# Patient Record
Sex: MALE | Race: AMERICAN INDIAN OR ALASKA NATIVE | NOT HISPANIC OR LATINO | Employment: FULL TIME | RURAL
[De-identification: names, ages, dates, MRNs, and addresses within clinical notes are randomized per-mention and may not be internally consistent; named-entity substitution may affect disease eponyms.]

---

## 2021-10-22 ENCOUNTER — HOSPITAL ENCOUNTER (INPATIENT)
Facility: HOSPITAL | Age: 51
LOS: 5 days | Discharge: HOME HEALTH | DRG: 256 | End: 2021-10-27
Attending: HOSPITALIST | Admitting: HOSPITALIST
Payer: COMMERCIAL

## 2021-10-22 DIAGNOSIS — E11.8 DIABETIC FOOT: ICD-10-CM

## 2021-10-22 DIAGNOSIS — E11.59 TYPE 2 DIABETES MELLITUS WITH OTHER CIRCULATORY COMPLICATION, WITHOUT LONG-TERM CURRENT USE OF INSULIN: ICD-10-CM

## 2021-10-22 DIAGNOSIS — E11.65 TYPE 2 DIABETES MELLITUS WITH HYPERGLYCEMIA, WITHOUT LONG-TERM CURRENT USE OF INSULIN: Primary | ICD-10-CM

## 2021-10-22 DIAGNOSIS — E11.9 TYPE 2 DIABETES MELLITUS WITHOUT COMPLICATION, UNSPECIFIED WHETHER LONG TERM INSULIN USE: ICD-10-CM

## 2021-10-22 DIAGNOSIS — Z01.818 PREOPERATIVE CLEARANCE: ICD-10-CM

## 2021-10-22 LAB
ALBUMIN SERPL BCP-MCNC: 2.5 G/DL (ref 3.5–5)
ALBUMIN/GLOB SERPL: 0.4 {RATIO}
ALP SERPL-CCNC: 427 U/L (ref 45–115)
ALT SERPL W P-5'-P-CCNC: 35 U/L (ref 16–61)
ANION GAP SERPL CALCULATED.3IONS-SCNC: 9 MMOL/L (ref 7–16)
APTT PPP: 35.1 SECONDS (ref 25.2–37.3)
AST SERPL W P-5'-P-CCNC: 23 U/L (ref 15–37)
BASOPHILS # BLD AUTO: 0.04 K/UL (ref 0–0.2)
BASOPHILS NFR BLD AUTO: 0.4 % (ref 0–1)
BILIRUB SERPL-MCNC: 0.6 MG/DL (ref 0–1.2)
BUN SERPL-MCNC: 5 MG/DL (ref 7–18)
BUN/CREAT SERPL: 5 (ref 6–20)
CALCIUM SERPL-MCNC: 8.8 MG/DL (ref 8.5–10.1)
CHLORIDE SERPL-SCNC: 100 MMOL/L (ref 98–107)
CO2 SERPL-SCNC: 31 MMOL/L (ref 21–32)
CREAT SERPL-MCNC: 0.91 MG/DL (ref 0.7–1.3)
CRP SERPL-MCNC: 8.5 MG/DL (ref 0–0.8)
DIFFERENTIAL METHOD BLD: ABNORMAL
EOSINOPHIL # BLD AUTO: 0.1 K/UL (ref 0–0.5)
EOSINOPHIL NFR BLD AUTO: 1 % (ref 1–4)
ERYTHROCYTE [DISTWIDTH] IN BLOOD BY AUTOMATED COUNT: 12.9 % (ref 11.5–14.5)
ERYTHROCYTE [SEDIMENTATION RATE] IN BLOOD BY WESTERGREN METHOD: 106 MM/HR (ref 0–20)
EST. AVERAGE GLUCOSE BLD GHB EST-MCNC: 240 MG/DL
GLOBULIN SER-MCNC: 6.3 G/DL (ref 2–4)
GLUCOSE SERPL-MCNC: 188 MG/DL (ref 74–106)
GLUCOSE SERPL-MCNC: 192 MG/DL (ref 70–105)
HBA1C MFR BLD HPLC: 9.8 % (ref 4.5–6.6)
HCT VFR BLD AUTO: 38.6 % (ref 40–54)
HGB BLD-MCNC: 13.3 G/DL (ref 13.5–18)
IMM GRANULOCYTES # BLD AUTO: 0.05 K/UL (ref 0–0.04)
IMM GRANULOCYTES NFR BLD: 0.5 % (ref 0–0.4)
INR BLD: 0.89 (ref 0.9–1.1)
LYMPHOCYTES # BLD AUTO: 1.59 K/UL (ref 1–4.8)
LYMPHOCYTES NFR BLD AUTO: 16.2 % (ref 27–41)
MAGNESIUM SERPL-MCNC: 2.1 MG/DL (ref 1.7–2.3)
MCH RBC QN AUTO: 30.9 PG (ref 27–31)
MCHC RBC AUTO-ENTMCNC: 34.5 G/DL (ref 32–36)
MCV RBC AUTO: 89.6 FL (ref 80–96)
MONOCYTES # BLD AUTO: 0.77 K/UL (ref 0–0.8)
MONOCYTES NFR BLD AUTO: 7.8 % (ref 2–6)
MPC BLD CALC-MCNC: 10.1 FL (ref 9.4–12.4)
NEUTROPHILS # BLD AUTO: 7.27 K/UL (ref 1.8–7.7)
NEUTROPHILS NFR BLD AUTO: 74.1 % (ref 53–65)
NRBC # BLD AUTO: 0 X10E3/UL
NRBC, AUTO (.00): 0 %
PLATELET # BLD AUTO: 568 K/UL (ref 150–400)
POTASSIUM SERPL-SCNC: 4.1 MMOL/L (ref 3.5–5.1)
PROT SERPL-MCNC: 8.8 G/DL (ref 6.4–8.2)
PROTHROMBIN TIME: 12.1 SECONDS (ref 11.7–14.7)
RBC # BLD AUTO: 4.31 M/UL (ref 4.6–6.2)
SODIUM SERPL-SCNC: 136 MMOL/L (ref 136–145)
WBC # BLD AUTO: 9.82 K/UL (ref 4.5–11)

## 2021-10-22 PROCEDURE — 63600175 PHARM REV CODE 636 W HCPCS: Performed by: HOSPITALIST

## 2021-10-22 PROCEDURE — 85025 COMPLETE CBC W/AUTO DIFF WBC: CPT | Performed by: HOSPITALIST

## 2021-10-22 PROCEDURE — 25000003 PHARM REV CODE 250: Performed by: HOSPITALIST

## 2021-10-22 PROCEDURE — 36415 COLL VENOUS BLD VENIPUNCTURE: CPT | Performed by: HOSPITALIST

## 2021-10-22 PROCEDURE — 85730 THROMBOPLASTIN TIME PARTIAL: CPT | Performed by: HOSPITALIST

## 2021-10-22 PROCEDURE — 86140 C-REACTIVE PROTEIN: CPT | Performed by: HOSPITALIST

## 2021-10-22 PROCEDURE — 83735 ASSAY OF MAGNESIUM: CPT | Performed by: HOSPITALIST

## 2021-10-22 PROCEDURE — 83036 HEMOGLOBIN GLYCOSYLATED A1C: CPT | Performed by: HOSPITALIST

## 2021-10-22 PROCEDURE — 85610 PROTHROMBIN TIME: CPT | Performed by: HOSPITALIST

## 2021-10-22 PROCEDURE — C9399 UNCLASSIFIED DRUGS OR BIOLOG: HCPCS | Performed by: HOSPITALIST

## 2021-10-22 PROCEDURE — 11000001 HC ACUTE MED/SURG PRIVATE ROOM

## 2021-10-22 PROCEDURE — 99222 PR INITIAL HOSPITAL CARE,LEVL II: ICD-10-PCS | Mod: ,,, | Performed by: HOSPITALIST

## 2021-10-22 PROCEDURE — 93010 ELECTROCARDIOGRAM REPORT: CPT | Mod: ,,, | Performed by: INTERNAL MEDICINE

## 2021-10-22 PROCEDURE — 80053 COMPREHEN METABOLIC PANEL: CPT | Performed by: HOSPITALIST

## 2021-10-22 PROCEDURE — 99222 1ST HOSP IP/OBS MODERATE 55: CPT | Mod: ,,, | Performed by: HOSPITALIST

## 2021-10-22 PROCEDURE — 93010 EKG 12-LEAD: ICD-10-PCS | Mod: ,,, | Performed by: INTERNAL MEDICINE

## 2021-10-22 PROCEDURE — 82962 GLUCOSE BLOOD TEST: CPT

## 2021-10-22 PROCEDURE — 85651 RBC SED RATE NONAUTOMATED: CPT | Performed by: HOSPITALIST

## 2021-10-22 PROCEDURE — 93005 ELECTROCARDIOGRAM TRACING: CPT

## 2021-10-22 RX ORDER — ONDANSETRON 2 MG/ML
8 INJECTION INTRAMUSCULAR; INTRAVENOUS EVERY 6 HOURS PRN
Status: DISCONTINUED | OUTPATIENT
Start: 2021-10-22 | End: 2021-10-27 | Stop reason: HOSPADM

## 2021-10-22 RX ORDER — INSULIN ASPART 100 [IU]/ML
1-10 INJECTION, SOLUTION INTRAVENOUS; SUBCUTANEOUS
Status: DISCONTINUED | OUTPATIENT
Start: 2021-10-22 | End: 2021-10-23

## 2021-10-22 RX ORDER — BISACODYL 5 MG
10 TABLET, DELAYED RELEASE (ENTERIC COATED) ORAL DAILY PRN
Status: DISCONTINUED | OUTPATIENT
Start: 2021-10-22 | End: 2021-10-27 | Stop reason: HOSPADM

## 2021-10-22 RX ORDER — GLUCAGON 1 MG
1 KIT INJECTION
Status: DISCONTINUED | OUTPATIENT
Start: 2021-10-22 | End: 2021-10-23

## 2021-10-22 RX ORDER — TRAZODONE HYDROCHLORIDE 50 MG/1
50 TABLET ORAL NIGHTLY PRN
Status: DISCONTINUED | OUTPATIENT
Start: 2021-10-22 | End: 2021-10-27 | Stop reason: HOSPADM

## 2021-10-22 RX ORDER — IBUPROFEN 200 MG
24 TABLET ORAL
Status: DISCONTINUED | OUTPATIENT
Start: 2021-10-22 | End: 2021-10-23

## 2021-10-22 RX ORDER — ACETAMINOPHEN 500 MG
1000 TABLET ORAL EVERY 6 HOURS PRN
Status: DISCONTINUED | OUTPATIENT
Start: 2021-10-22 | End: 2021-10-27 | Stop reason: HOSPADM

## 2021-10-22 RX ORDER — GUAIFENESIN/DEXTROMETHORPHAN 100-10MG/5
10 SYRUP ORAL EVERY 6 HOURS PRN
Status: DISCONTINUED | OUTPATIENT
Start: 2021-10-22 | End: 2021-10-27 | Stop reason: HOSPADM

## 2021-10-22 RX ORDER — SIMETHICONE 80 MG
1 TABLET,CHEWABLE ORAL 3 TIMES DAILY PRN
Status: DISCONTINUED | OUTPATIENT
Start: 2021-10-22 | End: 2021-10-27 | Stop reason: HOSPADM

## 2021-10-22 RX ORDER — MORPHINE SULFATE 4 MG/ML
4 INJECTION, SOLUTION INTRAMUSCULAR; INTRAVENOUS ONCE
Status: COMPLETED | OUTPATIENT
Start: 2021-10-22 | End: 2021-10-22

## 2021-10-22 RX ORDER — TRAMADOL HYDROCHLORIDE 50 MG/1
100 TABLET ORAL EVERY 6 HOURS PRN
Status: DISCONTINUED | OUTPATIENT
Start: 2021-10-22 | End: 2021-10-27 | Stop reason: HOSPADM

## 2021-10-22 RX ORDER — DIPHENHYDRAMINE HYDROCHLORIDE 50 MG/ML
50 INJECTION INTRAMUSCULAR; INTRAVENOUS ONCE
Status: COMPLETED | OUTPATIENT
Start: 2021-10-22 | End: 2021-10-22

## 2021-10-22 RX ORDER — IBUPROFEN 200 MG
16 TABLET ORAL
Status: DISCONTINUED | OUTPATIENT
Start: 2021-10-22 | End: 2021-10-23

## 2021-10-22 RX ADMIN — INSULIN ASPART 1 UNITS: 100 INJECTION, SOLUTION INTRAVENOUS; SUBCUTANEOUS at 08:10

## 2021-10-22 RX ADMIN — INSULIN DETEMIR 10 UNITS: 100 INJECTION, SOLUTION SUBCUTANEOUS at 08:10

## 2021-10-22 RX ADMIN — MORPHINE SULFATE 4 MG: 4 INJECTION INTRAVENOUS at 09:10

## 2021-10-22 RX ADMIN — PIPERACILLIN AND TAZOBACTAM 4.5 G: 4; .5 INJECTION, POWDER, LYOPHILIZED, FOR SOLUTION INTRAVENOUS; PARENTERAL at 08:10

## 2021-10-22 RX ADMIN — DIPHENHYDRAMINE HYDROCHLORIDE 50 MG: 50 INJECTION INTRAMUSCULAR; INTRAVENOUS at 09:10

## 2021-10-23 ENCOUNTER — ANESTHESIA (OUTPATIENT)
Dept: SURGERY | Facility: HOSPITAL | Age: 51
DRG: 256 | End: 2021-10-23
Payer: COMMERCIAL

## 2021-10-23 ENCOUNTER — ANESTHESIA EVENT (OUTPATIENT)
Dept: SURGERY | Facility: HOSPITAL | Age: 51
DRG: 256 | End: 2021-10-23
Payer: COMMERCIAL

## 2021-10-23 LAB
GLUCOSE SERPL-MCNC: 78 MG/DL (ref 70–105)
GLUCOSE SERPL-MCNC: 78 MG/DL (ref 70–105)
GLUCOSE SERPL-MCNC: 82 MG/DL (ref 70–105)
VANCOMYCIN SERPL-MCNC: 11.8 ΜG/ML (ref 0–20)

## 2021-10-23 PROCEDURE — 87070 CULTURE OTHR SPECIMN AEROBIC: CPT | Performed by: SURGERY

## 2021-10-23 PROCEDURE — 28820 AMPUTATION OF TOE: CPT | Mod: T6,,, | Performed by: SURGERY

## 2021-10-23 PROCEDURE — 36000706: Performed by: SURGERY

## 2021-10-23 PROCEDURE — 88305 TISSUE EXAM BY PATHOLOGIST: CPT | Mod: SUR | Performed by: SURGERY

## 2021-10-23 PROCEDURE — 88311 DECALCIFY TISSUE: CPT | Mod: 26,,, | Performed by: PATHOLOGY

## 2021-10-23 PROCEDURE — 71000033 HC RECOVERY, INTIAL HOUR: Performed by: SURGERY

## 2021-10-23 PROCEDURE — 88311 SURGICAL PATHOLOGY: ICD-10-PCS | Mod: 26,,, | Performed by: PATHOLOGY

## 2021-10-23 PROCEDURE — 11000001 HC ACUTE MED/SURG PRIVATE ROOM

## 2021-10-23 PROCEDURE — 87075 CULTR BACTERIA EXCEPT BLOOD: CPT | Performed by: SURGERY

## 2021-10-23 PROCEDURE — 36415 COLL VENOUS BLD VENIPUNCTURE: CPT | Performed by: HOSPITALIST

## 2021-10-23 PROCEDURE — 80202 ASSAY OF VANCOMYCIN: CPT | Performed by: HOSPITALIST

## 2021-10-23 PROCEDURE — 99233 PR SUBSEQUENT HOSPITAL CARE,LEVL III: ICD-10-PCS | Mod: ,,, | Performed by: HOSPITALIST

## 2021-10-23 PROCEDURE — 10061 I&D ABSCESS COMP/MULTIPLE: CPT | Mod: 51,,, | Performed by: SURGERY

## 2021-10-23 PROCEDURE — 63600175 PHARM REV CODE 636 W HCPCS: Performed by: HOSPITALIST

## 2021-10-23 PROCEDURE — 88305 TISSUE EXAM BY PATHOLOGIST: CPT | Mod: 26,,, | Performed by: PATHOLOGY

## 2021-10-23 PROCEDURE — 25000003 PHARM REV CODE 250: Performed by: HOSPITALIST

## 2021-10-23 PROCEDURE — 88305 TISSUE EXAM BY PATHOLOGIST: ICD-10-PCS | Mod: 26,XU,, | Performed by: PATHOLOGY

## 2021-10-23 PROCEDURE — D9220A PRA ANESTHESIA: ICD-10-PCS | Mod: ,,, | Performed by: ANESTHESIOLOGY

## 2021-10-23 PROCEDURE — 28820 PR AMPUTATION TOE,MT-P JT: ICD-10-PCS | Mod: T6,,, | Performed by: SURGERY

## 2021-10-23 PROCEDURE — 36000707: Performed by: SURGERY

## 2021-10-23 PROCEDURE — 10061 PR DRAIN SKIN ABSCESS COMPLIC: ICD-10-PCS | Mod: 51,,, | Performed by: SURGERY

## 2021-10-23 PROCEDURE — 99233 SBSQ HOSP IP/OBS HIGH 50: CPT | Mod: ,,, | Performed by: HOSPITALIST

## 2021-10-23 PROCEDURE — 37000009 HC ANESTHESIA EA ADD 15 MINS: Performed by: SURGERY

## 2021-10-23 PROCEDURE — D9220A PRA ANESTHESIA: Mod: ,,, | Performed by: ANESTHESIOLOGY

## 2021-10-23 PROCEDURE — 27201423 OPTIME MED/SURG SUP & DEVICES STERILE SUPPLY: Performed by: SURGERY

## 2021-10-23 PROCEDURE — 37000008 HC ANESTHESIA 1ST 15 MINUTES: Performed by: SURGERY

## 2021-10-23 PROCEDURE — 25000003 PHARM REV CODE 250: Performed by: SURGERY

## 2021-10-23 PROCEDURE — 63600175 PHARM REV CODE 636 W HCPCS: Performed by: ANESTHESIOLOGY

## 2021-10-23 PROCEDURE — 82962 GLUCOSE BLOOD TEST: CPT

## 2021-10-23 PROCEDURE — 88305 TISSUE EXAM BY PATHOLOGIST: CPT | Mod: 26,XU,, | Performed by: PATHOLOGY

## 2021-10-23 RX ORDER — ASPIRIN 81 MG/1
81 TABLET ORAL DAILY
Status: DISCONTINUED | OUTPATIENT
Start: 2021-10-23 | End: 2021-10-27 | Stop reason: HOSPADM

## 2021-10-23 RX ORDER — PRAVASTATIN SODIUM 20 MG/1
20 TABLET ORAL NIGHTLY
Status: ON HOLD | COMMUNITY
End: 2023-05-02 | Stop reason: HOSPADM

## 2021-10-23 RX ORDER — MORPHINE SULFATE 10 MG/ML
4 INJECTION INTRAMUSCULAR; INTRAVENOUS; SUBCUTANEOUS EVERY 5 MIN PRN
Status: DISCONTINUED | OUTPATIENT
Start: 2021-10-23 | End: 2021-10-23 | Stop reason: HOSPADM

## 2021-10-23 RX ORDER — OXYCODONE HYDROCHLORIDE 5 MG/1
5 TABLET ORAL
Status: DISCONTINUED | OUTPATIENT
Start: 2021-10-23 | End: 2021-10-23 | Stop reason: HOSPADM

## 2021-10-23 RX ORDER — IPRATROPIUM BROMIDE AND ALBUTEROL SULFATE 2.5; .5 MG/3ML; MG/3ML
3 SOLUTION RESPIRATORY (INHALATION)
Status: DISCONTINUED | OUTPATIENT
Start: 2021-10-23 | End: 2021-10-27 | Stop reason: HOSPADM

## 2021-10-23 RX ORDER — ONDANSETRON 2 MG/ML
4 INJECTION INTRAMUSCULAR; INTRAVENOUS DAILY PRN
Status: DISCONTINUED | OUTPATIENT
Start: 2021-10-23 | End: 2021-10-23 | Stop reason: HOSPADM

## 2021-10-23 RX ORDER — CEFAZOLIN SODIUM 1 G/3ML
INJECTION, POWDER, FOR SOLUTION INTRAMUSCULAR; INTRAVENOUS
Status: DISCONTINUED | OUTPATIENT
Start: 2021-10-23 | End: 2021-10-23

## 2021-10-23 RX ORDER — PROPOFOL 10 MG/ML
VIAL (ML) INTRAVENOUS
Status: DISCONTINUED | OUTPATIENT
Start: 2021-10-23 | End: 2021-10-23

## 2021-10-23 RX ORDER — HYDROMORPHONE HYDROCHLORIDE 2 MG/ML
0.5 INJECTION, SOLUTION INTRAMUSCULAR; INTRAVENOUS; SUBCUTANEOUS EVERY 5 MIN PRN
Status: DISCONTINUED | OUTPATIENT
Start: 2021-10-23 | End: 2021-10-23 | Stop reason: HOSPADM

## 2021-10-23 RX ORDER — BUPIVACAINE HYDROCHLORIDE 2.5 MG/ML
INJECTION, SOLUTION EPIDURAL; INFILTRATION; INTRACAUDAL
Status: DISCONTINUED | OUTPATIENT
Start: 2021-10-23 | End: 2021-10-23 | Stop reason: HOSPADM

## 2021-10-23 RX ORDER — MEPERIDINE HYDROCHLORIDE 25 MG/ML
25 INJECTION INTRAMUSCULAR; INTRAVENOUS; SUBCUTANEOUS EVERY 10 MIN PRN
Status: DISCONTINUED | OUTPATIENT
Start: 2021-10-23 | End: 2021-10-23 | Stop reason: HOSPADM

## 2021-10-23 RX ORDER — PRAVASTATIN SODIUM 10 MG/1
20 TABLET ORAL NIGHTLY
Status: DISCONTINUED | OUTPATIENT
Start: 2021-10-23 | End: 2021-10-27 | Stop reason: HOSPADM

## 2021-10-23 RX ORDER — METFORMIN HYDROCHLORIDE 500 MG/1
500 TABLET ORAL 2 TIMES DAILY WITH MEALS
COMMUNITY

## 2021-10-23 RX ORDER — PANTOPRAZOLE SODIUM 40 MG/1
40 TABLET, DELAYED RELEASE ORAL DAILY
Status: DISCONTINUED | OUTPATIENT
Start: 2021-10-24 | End: 2021-10-27 | Stop reason: HOSPADM

## 2021-10-23 RX ORDER — DIPHENHYDRAMINE HYDROCHLORIDE 50 MG/ML
25 INJECTION INTRAMUSCULAR; INTRAVENOUS EVERY 6 HOURS PRN
Status: DISCONTINUED | OUTPATIENT
Start: 2021-10-23 | End: 2021-10-23 | Stop reason: HOSPADM

## 2021-10-23 RX ORDER — SODIUM CHLORIDE, SODIUM LACTATE, POTASSIUM CHLORIDE, CALCIUM CHLORIDE 600; 310; 30; 20 MG/100ML; MG/100ML; MG/100ML; MG/100ML
125 INJECTION, SOLUTION INTRAVENOUS CONTINUOUS
Status: DISCONTINUED | OUTPATIENT
Start: 2021-10-23 | End: 2021-10-25

## 2021-10-23 RX ORDER — MORPHINE SULFATE 10 MG/ML
4 INJECTION INTRAMUSCULAR; INTRAVENOUS; SUBCUTANEOUS EVERY 4 HOURS PRN
Status: DISCONTINUED | OUTPATIENT
Start: 2021-10-23 | End: 2021-10-24

## 2021-10-23 RX ORDER — FENTANYL CITRATE 50 UG/ML
INJECTION, SOLUTION INTRAMUSCULAR; INTRAVENOUS
Status: DISCONTINUED | OUTPATIENT
Start: 2021-10-23 | End: 2021-10-23

## 2021-10-23 RX ORDER — GLIMEPIRIDE 4 MG/1
4 TABLET ORAL
COMMUNITY

## 2021-10-23 RX ORDER — ASPIRIN 81 MG/1
81 TABLET ORAL DAILY
COMMUNITY

## 2021-10-23 RX ADMIN — CEFAZOLIN 2 G: 1 INJECTION, POWDER, FOR SOLUTION INTRAMUSCULAR; INTRAVENOUS; PARENTERAL at 02:10

## 2021-10-23 RX ADMIN — THIAMINE HYDROCHLORIDE: 100 INJECTION, SOLUTION INTRAMUSCULAR; INTRAVENOUS at 09:10

## 2021-10-23 RX ADMIN — FENTANYL CITRATE 100 MCG: 50 INJECTION INTRAMUSCULAR; INTRAVENOUS at 02:10

## 2021-10-23 RX ADMIN — PRAVASTATIN SODIUM 20 MG: 10 TABLET ORAL at 08:10

## 2021-10-23 RX ADMIN — ONDANSETRON 8 MG: 2 INJECTION INTRAMUSCULAR; INTRAVENOUS at 12:10

## 2021-10-23 RX ADMIN — PIPERACILLIN AND TAZOBACTAM 4.5 G: 4; .5 INJECTION, POWDER, FOR SOLUTION INTRAVENOUS at 12:10

## 2021-10-23 RX ADMIN — TRAMADOL HYDROCHLORIDE 100 MG: 50 TABLET, COATED ORAL at 06:10

## 2021-10-23 RX ADMIN — PROPOFOL 150 MG: 10 INJECTION, EMULSION INTRAVENOUS at 02:10

## 2021-10-23 RX ADMIN — PIPERACILLIN AND TAZOBACTAM 4.5 G: 4; .5 INJECTION, POWDER, FOR SOLUTION INTRAVENOUS at 09:10

## 2021-10-23 RX ADMIN — ASPIRIN 81 MG: 81 TABLET, COATED ORAL at 09:10

## 2021-10-23 RX ADMIN — PIPERACILLIN AND TAZOBACTAM 4.5 G: 4; .5 INJECTION, POWDER, FOR SOLUTION INTRAVENOUS at 04:10

## 2021-10-24 PROCEDURE — 63600175 PHARM REV CODE 636 W HCPCS: Performed by: SURGERY

## 2021-10-24 PROCEDURE — 63600175 PHARM REV CODE 636 W HCPCS

## 2021-10-24 PROCEDURE — 25000003 PHARM REV CODE 250: Performed by: HOSPITALIST

## 2021-10-24 PROCEDURE — 63600175 PHARM REV CODE 636 W HCPCS: Performed by: HOSPITALIST

## 2021-10-24 PROCEDURE — 63600175 PHARM REV CODE 636 W HCPCS: Performed by: ANESTHESIOLOGY

## 2021-10-24 PROCEDURE — 99232 PR SUBSEQUENT HOSPITAL CARE,LEVL II: ICD-10-PCS | Mod: ,,, | Performed by: HOSPITALIST

## 2021-10-24 PROCEDURE — 11000001 HC ACUTE MED/SURG PRIVATE ROOM

## 2021-10-24 PROCEDURE — 99232 SBSQ HOSP IP/OBS MODERATE 35: CPT | Mod: ,,, | Performed by: HOSPITALIST

## 2021-10-24 RX ORDER — MORPHINE SULFATE 4 MG/ML
4 INJECTION, SOLUTION INTRAMUSCULAR; INTRAVENOUS EVERY 4 HOURS PRN
Status: DISCONTINUED | OUTPATIENT
Start: 2021-10-24 | End: 2021-10-27 | Stop reason: HOSPADM

## 2021-10-24 RX ORDER — MORPHINE SULFATE 4 MG/ML
INJECTION, SOLUTION INTRAMUSCULAR; INTRAVENOUS
Status: COMPLETED
Start: 2021-10-24 | End: 2021-10-24

## 2021-10-24 RX ADMIN — PIPERACILLIN AND TAZOBACTAM 4.5 G: 4; .5 INJECTION, POWDER, FOR SOLUTION INTRAVENOUS at 08:10

## 2021-10-24 RX ADMIN — PANTOPRAZOLE SODIUM 40 MG: 40 TABLET, DELAYED RELEASE ORAL at 08:10

## 2021-10-24 RX ADMIN — ONDANSETRON 8 MG: 2 INJECTION INTRAMUSCULAR; INTRAVENOUS at 08:10

## 2021-10-24 RX ADMIN — THIAMINE HYDROCHLORIDE: 100 INJECTION, SOLUTION INTRAMUSCULAR; INTRAVENOUS at 04:10

## 2021-10-24 RX ADMIN — PIPERACILLIN AND TAZOBACTAM 4.5 G: 4; .5 INJECTION, POWDER, FOR SOLUTION INTRAVENOUS at 12:10

## 2021-10-24 RX ADMIN — PIPERACILLIN AND TAZOBACTAM 4.5 G: 4; .5 INJECTION, POWDER, FOR SOLUTION INTRAVENOUS at 04:10

## 2021-10-24 RX ADMIN — MORPHINE SULFATE 4 MG: 4 INJECTION INTRAVENOUS at 08:10

## 2021-10-24 RX ADMIN — ASPIRIN 81 MG: 81 TABLET, COATED ORAL at 08:10

## 2021-10-24 RX ADMIN — SODIUM CHLORIDE, POTASSIUM CHLORIDE, SODIUM LACTATE AND CALCIUM CHLORIDE 125 ML/HR: 600; 310; 30; 20 INJECTION, SOLUTION INTRAVENOUS at 05:10

## 2021-10-24 RX ADMIN — PRAVASTATIN SODIUM 20 MG: 10 TABLET ORAL at 08:10

## 2021-10-24 RX ADMIN — TRAMADOL HYDROCHLORIDE 100 MG: 50 TABLET, COATED ORAL at 05:10

## 2021-10-25 LAB
GLUCOSE SERPL-MCNC: 203 MG/DL (ref 70–105)
GLUCOSE SERPL-MCNC: 213 MG/DL (ref 70–105)
GLUCOSE SERPL-MCNC: 265 MG/DL (ref 70–105)
MICROORGANISM SPEC CULT: ABNORMAL

## 2021-10-25 PROCEDURE — 99232 PR SUBSEQUENT HOSPITAL CARE,LEVL II: ICD-10-PCS | Mod: ,,, | Performed by: HOSPITALIST

## 2021-10-25 PROCEDURE — 63600175 PHARM REV CODE 636 W HCPCS: Performed by: SURGERY

## 2021-10-25 PROCEDURE — 25000003 PHARM REV CODE 250: Performed by: HOSPITALIST

## 2021-10-25 PROCEDURE — 63600175 PHARM REV CODE 636 W HCPCS: Performed by: NURSE PRACTITIONER

## 2021-10-25 PROCEDURE — 97161 PT EVAL LOW COMPLEX 20 MIN: CPT

## 2021-10-25 PROCEDURE — 63600175 PHARM REV CODE 636 W HCPCS: Performed by: HOSPITALIST

## 2021-10-25 PROCEDURE — 11000001 HC ACUTE MED/SURG PRIVATE ROOM

## 2021-10-25 PROCEDURE — 99232 SBSQ HOSP IP/OBS MODERATE 35: CPT | Mod: ,,, | Performed by: HOSPITALIST

## 2021-10-25 PROCEDURE — 63600175 PHARM REV CODE 636 W HCPCS: Performed by: ANESTHESIOLOGY

## 2021-10-25 PROCEDURE — 82962 GLUCOSE BLOOD TEST: CPT

## 2021-10-25 PROCEDURE — 25000003 PHARM REV CODE 250: Performed by: NURSE PRACTITIONER

## 2021-10-25 RX ORDER — INSULIN ASPART 100 [IU]/ML
1-10 INJECTION, SOLUTION INTRAVENOUS; SUBCUTANEOUS
Status: DISCONTINUED | OUTPATIENT
Start: 2021-10-25 | End: 2021-10-27 | Stop reason: HOSPADM

## 2021-10-25 RX ORDER — GLUCAGON 1 MG
1 KIT INJECTION
Status: DISCONTINUED | OUTPATIENT
Start: 2021-10-25 | End: 2021-10-27 | Stop reason: HOSPADM

## 2021-10-25 RX ORDER — GLIMEPIRIDE 4 MG/1
4 TABLET ORAL
Status: DISCONTINUED | OUTPATIENT
Start: 2021-10-26 | End: 2021-10-27 | Stop reason: HOSPADM

## 2021-10-25 RX ORDER — LACTOBACILLUS ACIDOPHILUS 500MM CELL
1 CAPSULE ORAL
Status: DISCONTINUED | OUTPATIENT
Start: 2021-10-25 | End: 2021-10-27 | Stop reason: HOSPADM

## 2021-10-25 RX ADMIN — AMPICILLIN SODIUM AND SULBACTAM SODIUM 3 G: 2; 1 INJECTION, POWDER, FOR SOLUTION INTRAMUSCULAR; INTRAVENOUS at 09:10

## 2021-10-25 RX ADMIN — PANTOPRAZOLE SODIUM 40 MG: 40 TABLET, DELAYED RELEASE ORAL at 10:10

## 2021-10-25 RX ADMIN — INSULIN ASPART 2 UNITS: 100 INJECTION, SOLUTION INTRAVENOUS; SUBCUTANEOUS at 09:10

## 2021-10-25 RX ADMIN — AMPICILLIN SODIUM AND SULBACTAM SODIUM 3 G: 2; 1 INJECTION, POWDER, FOR SOLUTION INTRAMUSCULAR; INTRAVENOUS at 02:10

## 2021-10-25 RX ADMIN — Medication 1 CAPSULE: at 04:10

## 2021-10-25 RX ADMIN — PRAVASTATIN SODIUM 20 MG: 10 TABLET ORAL at 09:10

## 2021-10-25 RX ADMIN — PIPERACILLIN AND TAZOBACTAM 4.5 G: 4; .5 INJECTION, POWDER, FOR SOLUTION INTRAVENOUS at 12:10

## 2021-10-25 RX ADMIN — PIPERACILLIN AND TAZOBACTAM 4.5 G: 4; .5 INJECTION, POWDER, FOR SOLUTION INTRAVENOUS at 10:10

## 2021-10-25 RX ADMIN — ASPIRIN 81 MG: 81 TABLET, COATED ORAL at 10:10

## 2021-10-25 RX ADMIN — THIAMINE HYDROCHLORIDE: 100 INJECTION, SOLUTION INTRAMUSCULAR; INTRAVENOUS at 11:10

## 2021-10-25 RX ADMIN — MORPHINE SULFATE 4 MG: 4 INJECTION INTRAVENOUS at 09:10

## 2021-10-25 RX ADMIN — SODIUM CHLORIDE, POTASSIUM CHLORIDE, SODIUM LACTATE AND CALCIUM CHLORIDE 125 ML/HR: 600; 310; 30; 20 INJECTION, SOLUTION INTRAVENOUS at 07:10

## 2021-10-26 LAB
BACTERIA SPEC ANAEROBE CULT: NORMAL
GLUCOSE SERPL-MCNC: 174 MG/DL (ref 70–105)
GLUCOSE SERPL-MCNC: 178 MG/DL (ref 70–105)
GLUCOSE SERPL-MCNC: 185 MG/DL (ref 70–105)
GLUCOSE SERPL-MCNC: 247 MG/DL (ref 70–105)

## 2021-10-26 PROCEDURE — 25000003 PHARM REV CODE 250: Performed by: HOSPITALIST

## 2021-10-26 PROCEDURE — 99232 SBSQ HOSP IP/OBS MODERATE 35: CPT | Mod: ,,, | Performed by: HOSPITALIST

## 2021-10-26 PROCEDURE — 25000003 PHARM REV CODE 250: Performed by: NURSE PRACTITIONER

## 2021-10-26 PROCEDURE — 82962 GLUCOSE BLOOD TEST: CPT

## 2021-10-26 PROCEDURE — 63600175 PHARM REV CODE 636 W HCPCS: Performed by: NURSE PRACTITIONER

## 2021-10-26 PROCEDURE — 99232 PR SUBSEQUENT HOSPITAL CARE,LEVL II: ICD-10-PCS | Mod: ,,, | Performed by: HOSPITALIST

## 2021-10-26 PROCEDURE — 11000001 HC ACUTE MED/SURG PRIVATE ROOM

## 2021-10-26 RX ADMIN — Medication 1 CAPSULE: at 12:10

## 2021-10-26 RX ADMIN — AMPICILLIN SODIUM AND SULBACTAM SODIUM 3 G: 2; 1 INJECTION, POWDER, FOR SOLUTION INTRAMUSCULAR; INTRAVENOUS at 02:10

## 2021-10-26 RX ADMIN — Medication 1 CAPSULE: at 09:10

## 2021-10-26 RX ADMIN — AMPICILLIN SODIUM AND SULBACTAM SODIUM 3 G: 2; 1 INJECTION, POWDER, FOR SOLUTION INTRAMUSCULAR; INTRAVENOUS at 09:10

## 2021-10-26 RX ADMIN — INSULIN ASPART 4 UNITS: 100 INJECTION, SOLUTION INTRAVENOUS; SUBCUTANEOUS at 12:10

## 2021-10-26 RX ADMIN — Medication 1 CAPSULE: at 04:10

## 2021-10-26 RX ADMIN — ASPIRIN 81 MG: 81 TABLET, COATED ORAL at 09:10

## 2021-10-26 RX ADMIN — PRAVASTATIN SODIUM 20 MG: 10 TABLET ORAL at 09:10

## 2021-10-26 RX ADMIN — INSULIN ASPART 2 UNITS: 100 INJECTION, SOLUTION INTRAVENOUS; SUBCUTANEOUS at 08:10

## 2021-10-26 RX ADMIN — GLIMEPIRIDE 4 MG: 4 TABLET ORAL at 09:10

## 2021-10-26 RX ADMIN — INSULIN ASPART 2 UNITS: 100 INJECTION, SOLUTION INTRAVENOUS; SUBCUTANEOUS at 05:10

## 2021-10-26 RX ADMIN — PANTOPRAZOLE SODIUM 40 MG: 40 TABLET, DELAYED RELEASE ORAL at 09:10

## 2021-10-26 RX ADMIN — AMPICILLIN SODIUM AND SULBACTAM SODIUM 3 G: 2; 1 INJECTION, POWDER, FOR SOLUTION INTRAMUSCULAR; INTRAVENOUS at 08:10

## 2021-10-27 VITALS
HEIGHT: 67 IN | TEMPERATURE: 99 F | WEIGHT: 182.13 LBS | SYSTOLIC BLOOD PRESSURE: 131 MMHG | HEART RATE: 91 BPM | OXYGEN SATURATION: 97 % | DIASTOLIC BLOOD PRESSURE: 89 MMHG | BODY MASS INDEX: 28.58 KG/M2 | RESPIRATION RATE: 20 BRPM

## 2021-10-27 LAB
ANION GAP SERPL CALCULATED.3IONS-SCNC: 12 MMOL/L (ref 7–16)
BASOPHILS # BLD AUTO: 0.07 K/UL (ref 0–0.2)
BASOPHILS NFR BLD AUTO: 1 % (ref 0–1)
BUN SERPL-MCNC: 13 MG/DL (ref 7–18)
BUN/CREAT SERPL: 10 (ref 6–20)
CALCIUM SERPL-MCNC: 8.8 MG/DL (ref 8.5–10.1)
CHLORIDE SERPL-SCNC: 102 MMOL/L (ref 98–107)
CO2 SERPL-SCNC: 29 MMOL/L (ref 21–32)
CREAT SERPL-MCNC: 1.36 MG/DL (ref 0.7–1.3)
DIFFERENTIAL METHOD BLD: ABNORMAL
EOSINOPHIL # BLD AUTO: 0.12 K/UL (ref 0–0.5)
EOSINOPHIL NFR BLD AUTO: 1.7 % (ref 1–4)
ERYTHROCYTE [DISTWIDTH] IN BLOOD BY AUTOMATED COUNT: 12.9 % (ref 11.5–14.5)
GLUCOSE SERPL-MCNC: 218 MG/DL (ref 70–105)
GLUCOSE SERPL-MCNC: 313 MG/DL (ref 74–106)
HCT VFR BLD AUTO: 34.2 % (ref 40–54)
HGB BLD-MCNC: 12 G/DL (ref 13.5–18)
IMM GRANULOCYTES # BLD AUTO: 0.02 K/UL (ref 0–0.04)
IMM GRANULOCYTES NFR BLD: 0.3 % (ref 0–0.4)
LYMPHOCYTES # BLD AUTO: 1.95 K/UL (ref 1–4.8)
LYMPHOCYTES NFR BLD AUTO: 28.3 % (ref 27–41)
MCH RBC QN AUTO: 31.2 PG (ref 27–31)
MCHC RBC AUTO-ENTMCNC: 35.1 G/DL (ref 32–36)
MCV RBC AUTO: 88.8 FL (ref 80–96)
MONOCYTES # BLD AUTO: 0.54 K/UL (ref 0–0.8)
MONOCYTES NFR BLD AUTO: 7.8 % (ref 2–6)
MPC BLD CALC-MCNC: 9.6 FL (ref 9.4–12.4)
NEUTROPHILS # BLD AUTO: 4.2 K/UL (ref 1.8–7.7)
NEUTROPHILS NFR BLD AUTO: 60.9 % (ref 53–65)
NRBC # BLD AUTO: 0 X10E3/UL
NRBC, AUTO (.00): 0 %
PLATELET # BLD AUTO: 730 K/UL (ref 150–400)
POTASSIUM SERPL-SCNC: 3.9 MMOL/L (ref 3.5–5.1)
RBC # BLD AUTO: 3.85 M/UL (ref 4.6–6.2)
SODIUM SERPL-SCNC: 139 MMOL/L (ref 136–145)
WBC # BLD AUTO: 6.9 K/UL (ref 4.5–11)

## 2021-10-27 PROCEDURE — 99239 PR HOSPITAL DISCHARGE DAY,>30 MIN: ICD-10-PCS | Mod: ,,, | Performed by: HOSPITALIST

## 2021-10-27 PROCEDURE — 63600175 PHARM REV CODE 636 W HCPCS: Performed by: SURGERY

## 2021-10-27 PROCEDURE — 25000003 PHARM REV CODE 250: Performed by: HOSPITALIST

## 2021-10-27 PROCEDURE — 99239 HOSP IP/OBS DSCHRG MGMT >30: CPT | Mod: ,,, | Performed by: HOSPITALIST

## 2021-10-27 PROCEDURE — 85025 COMPLETE CBC W/AUTO DIFF WBC: CPT | Performed by: STUDENT IN AN ORGANIZED HEALTH CARE EDUCATION/TRAINING PROGRAM

## 2021-10-27 PROCEDURE — 80048 BASIC METABOLIC PNL TOTAL CA: CPT | Performed by: STUDENT IN AN ORGANIZED HEALTH CARE EDUCATION/TRAINING PROGRAM

## 2021-10-27 PROCEDURE — 97165 OT EVAL LOW COMPLEX 30 MIN: CPT

## 2021-10-27 PROCEDURE — 25000003 PHARM REV CODE 250: Performed by: SURGERY

## 2021-10-27 PROCEDURE — 82962 GLUCOSE BLOOD TEST: CPT

## 2021-10-27 PROCEDURE — 63600175 PHARM REV CODE 636 W HCPCS: Performed by: NURSE PRACTITIONER

## 2021-10-27 PROCEDURE — 25000003 PHARM REV CODE 250: Performed by: NURSE PRACTITIONER

## 2021-10-27 PROCEDURE — 36415 COLL VENOUS BLD VENIPUNCTURE: CPT | Performed by: STUDENT IN AN ORGANIZED HEALTH CARE EDUCATION/TRAINING PROGRAM

## 2021-10-27 RX ORDER — AMOXICILLIN AND CLAVULANATE POTASSIUM 875; 125 MG/1; MG/1
1 TABLET, FILM COATED ORAL EVERY 12 HOURS
Qty: 14 TABLET | Refills: 0 | Status: SHIPPED | OUTPATIENT
Start: 2021-10-27 | End: 2021-11-03

## 2021-10-27 RX ORDER — LACTOBACILLUS ACIDOPHILUS 500MM CELL
1 CAPSULE ORAL
Qty: 21 CAPSULE | Refills: 0 | Status: SHIPPED | OUTPATIENT
Start: 2021-10-27 | End: 2023-04-26 | Stop reason: CLARIF

## 2021-10-27 RX ORDER — TRAMADOL HYDROCHLORIDE 100 MG/1
50 TABLET, COATED ORAL EVERY 6 HOURS PRN
Qty: 10 TABLET | Refills: 0 | Status: SHIPPED | OUTPATIENT
Start: 2021-10-27 | End: 2023-04-26 | Stop reason: CLARIF

## 2021-10-27 RX ADMIN — TRAMADOL HYDROCHLORIDE 100 MG: 50 TABLET, COATED ORAL at 11:10

## 2021-10-27 RX ADMIN — Medication 1 CAPSULE: at 11:10

## 2021-10-27 RX ADMIN — Medication 1 CAPSULE: at 08:10

## 2021-10-27 RX ADMIN — ASPIRIN 81 MG: 81 TABLET, COATED ORAL at 08:10

## 2021-10-27 RX ADMIN — GLIMEPIRIDE 4 MG: 4 TABLET ORAL at 08:10

## 2021-10-27 RX ADMIN — AMPICILLIN SODIUM AND SULBACTAM SODIUM 3 G: 2; 1 INJECTION, POWDER, FOR SOLUTION INTRAMUSCULAR; INTRAVENOUS at 03:10

## 2021-10-27 RX ADMIN — Medication 1 CAPSULE: at 04:10

## 2021-10-27 RX ADMIN — AMPICILLIN SODIUM AND SULBACTAM SODIUM 3 G: 2; 1 INJECTION, POWDER, FOR SOLUTION INTRAMUSCULAR; INTRAVENOUS at 02:10

## 2021-10-27 RX ADMIN — MORPHINE SULFATE 4 MG: 4 INJECTION INTRAVENOUS at 04:10

## 2021-10-27 RX ADMIN — PANTOPRAZOLE SODIUM 40 MG: 40 TABLET, DELAYED RELEASE ORAL at 08:10

## 2021-10-27 RX ADMIN — AMPICILLIN SODIUM AND SULBACTAM SODIUM 3 G: 2; 1 INJECTION, POWDER, FOR SOLUTION INTRAMUSCULAR; INTRAVENOUS at 08:10

## 2021-10-28 LAB
ESTROGEN SERPL-MCNC: NORMAL PG/ML
LAB AP GROSS DESCRIPTION: NORMAL
LAB AP LABORATORY NOTES: NORMAL
T3RU NFR SERPL: NORMAL %

## 2021-11-15 ENCOUNTER — OFFICE VISIT (OUTPATIENT)
Dept: SURGERY | Facility: CLINIC | Age: 51
End: 2021-11-15
Attending: SURGERY
Payer: COMMERCIAL

## 2021-11-15 DIAGNOSIS — Z09 POSTOP CHECK: Primary | ICD-10-CM

## 2021-11-15 PROCEDURE — 3046F HEMOGLOBIN A1C LEVEL >9.0%: CPT | Mod: ,,, | Performed by: SURGERY

## 2021-11-15 PROCEDURE — 3046F PR MOST RECENT HEMOGLOBIN A1C LEVEL > 9.0%: ICD-10-PCS | Mod: ,,, | Performed by: SURGERY

## 2021-11-15 PROCEDURE — 1159F MED LIST DOCD IN RCRD: CPT | Mod: ,,, | Performed by: SURGERY

## 2021-11-15 PROCEDURE — 1159F PR MEDICATION LIST DOCUMENTED IN MEDICAL RECORD: ICD-10-PCS | Mod: ,,, | Performed by: SURGERY

## 2021-11-15 PROCEDURE — 99024 POSTOP FOLLOW-UP VISIT: CPT | Mod: ,,, | Performed by: SURGERY

## 2021-11-15 PROCEDURE — 1160F RVW MEDS BY RX/DR IN RCRD: CPT | Mod: ,,, | Performed by: SURGERY

## 2021-11-15 PROCEDURE — 1160F PR REVIEW ALL MEDS BY PRESCRIBER/CLIN PHARMACIST DOCUMENTED: ICD-10-PCS | Mod: ,,, | Performed by: SURGERY

## 2021-11-15 PROCEDURE — 99024 PR POST-OP FOLLOW-UP VISIT: ICD-10-PCS | Mod: ,,, | Performed by: SURGERY

## 2021-11-15 PROCEDURE — 99213 OFFICE O/P EST LOW 20 MIN: CPT | Mod: PBBFAC | Performed by: SURGERY

## 2021-11-16 PROBLEM — Z09 POSTOP CHECK: Status: ACTIVE | Noted: 2021-11-16

## 2021-12-13 ENCOUNTER — OFFICE VISIT (OUTPATIENT)
Dept: SURGERY | Facility: CLINIC | Age: 51
End: 2021-12-13
Attending: SURGERY
Payer: COMMERCIAL

## 2021-12-13 VITALS — HEIGHT: 70 IN | BODY MASS INDEX: 25.05 KG/M2 | WEIGHT: 175 LBS

## 2021-12-13 DIAGNOSIS — E11.8 DIABETIC FOOT: ICD-10-CM

## 2021-12-13 PROCEDURE — 99212 OFFICE O/P EST SF 10 MIN: CPT | Mod: S$PBB,,, | Performed by: SURGERY

## 2021-12-13 PROCEDURE — 99212 PR OFFICE/OUTPT VISIT, EST, LEVL II, 10-19 MIN: ICD-10-PCS | Mod: S$PBB,,, | Performed by: SURGERY

## 2021-12-13 PROCEDURE — 99213 OFFICE O/P EST LOW 20 MIN: CPT | Mod: PBBFAC | Performed by: SURGERY

## 2022-01-10 ENCOUNTER — OFFICE VISIT (OUTPATIENT)
Dept: SURGERY | Facility: CLINIC | Age: 52
End: 2022-01-10
Attending: SURGERY
Payer: COMMERCIAL

## 2022-01-10 VITALS — BODY MASS INDEX: 27.47 KG/M2 | HEIGHT: 67 IN | WEIGHT: 175 LBS

## 2022-01-10 DIAGNOSIS — Z09 POSTOP CHECK: ICD-10-CM

## 2022-01-10 DIAGNOSIS — E11.8 DIABETIC FOOT: Primary | ICD-10-CM

## 2022-01-10 PROCEDURE — 99024 PR POST-OP FOLLOW-UP VISIT: ICD-10-PCS | Mod: ,,, | Performed by: SURGERY

## 2022-01-10 PROCEDURE — 99024 POSTOP FOLLOW-UP VISIT: CPT | Mod: ,,, | Performed by: SURGERY

## 2022-01-10 PROCEDURE — 99213 OFFICE O/P EST LOW 20 MIN: CPT | Mod: PBBFAC | Performed by: SURGERY

## 2022-01-10 NOTE — PROGRESS NOTES
"Subjective:       Patient ID: James Bolaños Jr. is a 51 y.o. male.    Chief Complaint: Follow-up (foot)    S/p amputation right second toe.   No complaints.  Reports wound healed.       family history includes Diabetes in his father and mother; Heart disease in his father.  Past Medical History:   Diagnosis Date    Diabetes mellitus     Hypertension       Past Surgical History:   Procedure Laterality Date    CHOLECYSTECTOMY      DEBRIDEMENT OF FOOT      DEBRIDEMENT OF LOWER EXTREMITY Right 10/23/2021    Procedure: DEBRIDEMENT, LOWER EXTREMITY;  Surgeon: Fred Tan MD;  Location: [facility] OR;  Service: General;  Laterality: Right;  right foot    JOINT REPLACEMENT      TOE AMPUTATION Right 10/23/2021    Procedure: AMPUTATION, TOE;  Surgeon: Fred Tan MD;  Location: [facility] [place];  Service: General;  Laterality: Right;  second toe       reports that he has quit smoking. His smokeless tobacco use includes chew. He reports current alcohol use of about 24.0 standard drinks of alcohol per week. He reports that he does not use drugs.     Review of Systems       Objective:      Ht 5' 7" (1.702 m)   Wt 79.4 kg (175 lb)   BMI 27.41 kg/m²    Physical Exam  Skin:     Comments: Healed incision     Neurological:      Mental Status: He is alert.           Assessment/Plan:         Diabetic foot    Postop check         Problem List Items Addressed This Visit        Endocrine    Diabetic foot - Primary    Overview     Amputation right second toe several weeks ago            Other    Postop check    Overview     Right second toe amputation with debridement, a few weeks ago                "

## 2022-02-21 PROBLEM — Z09 POSTOP CHECK: Status: RESOLVED | Noted: 2021-11-16 | Resolved: 2022-02-21

## 2023-04-26 ENCOUNTER — HOSPITAL ENCOUNTER (INPATIENT)
Facility: HOSPITAL | Age: 53
LOS: 6 days | Discharge: HOME HEALTH | DRG: 629 | End: 2023-05-02
Attending: EMERGENCY MEDICINE | Admitting: FAMILY MEDICINE
Payer: COMMERCIAL

## 2023-04-26 DIAGNOSIS — I96 GANGRENE OF TOE: ICD-10-CM

## 2023-04-26 DIAGNOSIS — Z91.148 NONCOMPLIANCE WITH MEDICATION REGIMEN: ICD-10-CM

## 2023-04-26 DIAGNOSIS — R07.9 CHEST PAIN: ICD-10-CM

## 2023-04-26 DIAGNOSIS — E11.621 DIABETIC ULCER OF TOE OF LEFT FOOT ASSOCIATED WITH TYPE 2 DIABETES MELLITUS, WITH BONE INVOLVEMENT WITHOUT EVIDENCE OF NECROSIS: ICD-10-CM

## 2023-04-26 DIAGNOSIS — L97.526 DIABETIC ULCER OF TOE OF LEFT FOOT ASSOCIATED WITH TYPE 2 DIABETES MELLITUS, WITH BONE INVOLVEMENT WITHOUT EVIDENCE OF NECROSIS: ICD-10-CM

## 2023-04-26 DIAGNOSIS — E11.65 TYPE 2 DIABETES MELLITUS WITH HYPERGLYCEMIA, WITHOUT LONG-TERM CURRENT USE OF INSULIN: ICD-10-CM

## 2023-04-26 DIAGNOSIS — I96 GANGRENE OF RIGHT FOOT: Primary | ICD-10-CM

## 2023-04-26 PROBLEM — L97.515 DIABETIC ULCER OF TOE OF RIGHT FOOT ASSOCIATED WITH TYPE 2 DIABETES MELLITUS, WITH MUSCLE INVOLVEMENT WITHOUT EVIDENCE OF NECROSIS: Status: ACTIVE | Noted: 2023-04-26

## 2023-04-26 PROBLEM — L97.509 DIABETIC FOOT ULCER ASSOCIATED WITH TYPE 2 DIABETES MELLITUS: Status: ACTIVE | Noted: 2023-04-26

## 2023-04-26 PROBLEM — I10 HYPERTENSION: Status: ACTIVE | Noted: 2023-04-26

## 2023-04-26 PROBLEM — E78.5 HYPERLIPIDEMIA: Status: ACTIVE | Noted: 2023-04-26

## 2023-04-26 LAB
ALBUMIN SERPL BCP-MCNC: 2.9 G/DL (ref 3.5–5)
ALBUMIN/GLOB SERPL: 0.5 {RATIO}
ALP SERPL-CCNC: 104 U/L (ref 45–115)
ALT SERPL W P-5'-P-CCNC: 15 U/L (ref 16–61)
ANION GAP SERPL CALCULATED.3IONS-SCNC: 11 MMOL/L (ref 7–16)
AST SERPL W P-5'-P-CCNC: 8 U/L (ref 15–37)
BASOPHILS # BLD AUTO: 0.07 K/UL (ref 0–0.2)
BASOPHILS NFR BLD AUTO: 0.9 % (ref 0–1)
BILIRUB SERPL-MCNC: 0.6 MG/DL (ref ?–1.2)
BUN SERPL-MCNC: 10 MG/DL (ref 7–18)
BUN/CREAT SERPL: 12 (ref 6–20)
CALCIUM SERPL-MCNC: 9 MG/DL (ref 8.5–10.1)
CHLORIDE SERPL-SCNC: 100 MMOL/L (ref 98–107)
CO2 SERPL-SCNC: 28 MMOL/L (ref 21–32)
CREAT SERPL-MCNC: 0.81 MG/DL (ref 0.7–1.3)
CRP SERPL-MCNC: 15.1 MG/DL (ref 0–0.8)
DIFFERENTIAL METHOD BLD: ABNORMAL
EGFR (NO RACE VARIABLE) (RUSH/TITUS): 105 ML/MIN/1.73M²
EOSINOPHIL # BLD AUTO: 0.12 K/UL (ref 0–0.5)
EOSINOPHIL NFR BLD AUTO: 1.5 % (ref 1–4)
ERYTHROCYTE [DISTWIDTH] IN BLOOD BY AUTOMATED COUNT: 12.3 % (ref 11.5–14.5)
ERYTHROCYTE [SEDIMENTATION RATE] IN BLOOD BY WESTERGREN METHOD: 84 MM/HR (ref 0–20)
EST. AVERAGE GLUCOSE BLD GHB EST-MCNC: 304 MG/DL
GLOBULIN SER-MCNC: 5.6 G/DL (ref 2–4)
GLUCOSE SERPL-MCNC: 197 MG/DL (ref 70–105)
GLUCOSE SERPL-MCNC: 249 MG/DL (ref 74–106)
GLUCOSE SERPL-MCNC: 325 MG/DL (ref 70–105)
HBA1C MFR BLD HPLC: 11.7 % (ref 4.5–6.6)
HCT VFR BLD AUTO: 37.7 % (ref 40–54)
HGB BLD-MCNC: 12.7 G/DL (ref 13.5–18)
IMM GRANULOCYTES # BLD AUTO: 0.02 K/UL (ref 0–0.04)
IMM GRANULOCYTES NFR BLD: 0.3 % (ref 0–0.4)
LACTATE SERPL-SCNC: 0.8 MMOL/L (ref 0.4–2)
LYMPHOCYTES # BLD AUTO: 2.12 K/UL (ref 1–4.8)
LYMPHOCYTES NFR BLD AUTO: 26.8 % (ref 27–41)
MCH RBC QN AUTO: 30.3 PG (ref 27–31)
MCHC RBC AUTO-ENTMCNC: 33.7 G/DL (ref 32–36)
MCV RBC AUTO: 90 FL (ref 80–96)
MONOCYTES # BLD AUTO: 0.53 K/UL (ref 0–0.8)
MONOCYTES NFR BLD AUTO: 6.7 % (ref 2–6)
MPC BLD CALC-MCNC: 9.9 FL (ref 9.4–12.4)
NEUTROPHILS # BLD AUTO: 5.04 K/UL (ref 1.8–7.7)
NEUTROPHILS NFR BLD AUTO: 63.8 % (ref 53–65)
NRBC # BLD AUTO: 0 X10E3/UL
NRBC, AUTO (.00): 0 %
PLATELET # BLD AUTO: 383 K/UL (ref 150–400)
POTASSIUM SERPL-SCNC: 3.6 MMOL/L (ref 3.5–5.1)
PROT SERPL-MCNC: 8.5 G/DL (ref 6.4–8.2)
RBC # BLD AUTO: 4.19 M/UL (ref 4.6–6.2)
SODIUM SERPL-SCNC: 135 MMOL/L (ref 136–145)
WBC # BLD AUTO: 7.9 K/UL (ref 4.5–11)

## 2023-04-26 PROCEDURE — 80053 COMPREHEN METABOLIC PANEL: CPT | Performed by: EMERGENCY MEDICINE

## 2023-04-26 PROCEDURE — 85025 COMPLETE CBC W/AUTO DIFF WBC: CPT | Performed by: EMERGENCY MEDICINE

## 2023-04-26 PROCEDURE — 99285 PR EMERGENCY DEPT VISIT,LEVEL V: ICD-10-PCS | Mod: ,,, | Performed by: EMERGENCY MEDICINE

## 2023-04-26 PROCEDURE — 63600175 PHARM REV CODE 636 W HCPCS: Performed by: EMERGENCY MEDICINE

## 2023-04-26 PROCEDURE — 63600175 PHARM REV CODE 636 W HCPCS

## 2023-04-26 PROCEDURE — 83036 HEMOGLOBIN GLYCOSYLATED A1C: CPT

## 2023-04-26 PROCEDURE — 99285 EMERGENCY DEPT VISIT HI MDM: CPT

## 2023-04-26 PROCEDURE — 99285 EMERGENCY DEPT VISIT HI MDM: CPT | Mod: ,,, | Performed by: EMERGENCY MEDICINE

## 2023-04-26 PROCEDURE — 83605 ASSAY OF LACTIC ACID: CPT | Performed by: EMERGENCY MEDICINE

## 2023-04-26 PROCEDURE — 25000003 PHARM REV CODE 250

## 2023-04-26 PROCEDURE — 25000003 PHARM REV CODE 250: Performed by: EMERGENCY MEDICINE

## 2023-04-26 PROCEDURE — 99222 PR INITIAL HOSPITAL CARE,LEVL II: ICD-10-PCS | Mod: GC,,, | Performed by: FAMILY MEDICINE

## 2023-04-26 PROCEDURE — 11000001 HC ACUTE MED/SURG PRIVATE ROOM

## 2023-04-26 PROCEDURE — 87040 BLOOD CULTURE FOR BACTERIA: CPT | Performed by: EMERGENCY MEDICINE

## 2023-04-26 PROCEDURE — 99223 1ST HOSP IP/OBS HIGH 75: CPT | Mod: ,,, | Performed by: NURSE PRACTITIONER

## 2023-04-26 PROCEDURE — 85651 RBC SED RATE NONAUTOMATED: CPT

## 2023-04-26 PROCEDURE — 82962 GLUCOSE BLOOD TEST: CPT

## 2023-04-26 PROCEDURE — 86140 C-REACTIVE PROTEIN: CPT

## 2023-04-26 PROCEDURE — 99223 PR INITIAL HOSPITAL CARE,LEVL III: ICD-10-PCS | Mod: ,,, | Performed by: NURSE PRACTITIONER

## 2023-04-26 PROCEDURE — 99222 1ST HOSP IP/OBS MODERATE 55: CPT | Mod: GC,,, | Performed by: FAMILY MEDICINE

## 2023-04-26 PROCEDURE — 87070 CULTURE OTHR SPECIMN AEROBIC: CPT

## 2023-04-26 RX ORDER — GLUCAGON 1 MG
1 KIT INJECTION
Status: DISCONTINUED | OUTPATIENT
Start: 2023-04-26 | End: 2023-05-01

## 2023-04-26 RX ORDER — INSULIN ASPART 100 [IU]/ML
1-10 INJECTION, SOLUTION INTRAVENOUS; SUBCUTANEOUS EVERY 6 HOURS PRN
Status: DISCONTINUED | OUTPATIENT
Start: 2023-04-26 | End: 2023-05-02 | Stop reason: HOSPADM

## 2023-04-26 RX ORDER — PROMETHAZINE HYDROCHLORIDE 25 MG/1
25 TABLET ORAL EVERY 6 HOURS PRN
Status: DISCONTINUED | OUTPATIENT
Start: 2023-04-26 | End: 2023-05-02 | Stop reason: HOSPADM

## 2023-04-26 RX ORDER — GLUCAGON 1 MG
1 KIT INJECTION
Status: DISCONTINUED | OUTPATIENT
Start: 2023-04-26 | End: 2023-04-26

## 2023-04-26 RX ORDER — NALOXONE HCL 0.4 MG/ML
0.02 VIAL (ML) INJECTION
Status: DISCONTINUED | OUTPATIENT
Start: 2023-04-26 | End: 2023-05-02 | Stop reason: HOSPADM

## 2023-04-26 RX ORDER — ASPIRIN 81 MG/1
81 TABLET ORAL DAILY
Status: DISCONTINUED | OUTPATIENT
Start: 2023-04-26 | End: 2023-05-02 | Stop reason: HOSPADM

## 2023-04-26 RX ORDER — ACETAMINOPHEN 325 MG/1
650 TABLET ORAL EVERY 4 HOURS PRN
Status: DISCONTINUED | OUTPATIENT
Start: 2023-04-26 | End: 2023-04-27

## 2023-04-26 RX ORDER — DEXTROSE 40 %
30 GEL (GRAM) ORAL
Status: DISCONTINUED | OUTPATIENT
Start: 2023-04-26 | End: 2023-04-26

## 2023-04-26 RX ORDER — ONDANSETRON 2 MG/ML
4 INJECTION INTRAMUSCULAR; INTRAVENOUS EVERY 8 HOURS PRN
Status: DISCONTINUED | OUTPATIENT
Start: 2023-04-26 | End: 2023-05-02 | Stop reason: HOSPADM

## 2023-04-26 RX ORDER — HYDROCHLOROTHIAZIDE 25 MG/1
25 TABLET ORAL DAILY
Status: DISCONTINUED | OUTPATIENT
Start: 2023-04-26 | End: 2023-05-02 | Stop reason: HOSPADM

## 2023-04-26 RX ORDER — SODIUM CHLORIDE 0.9 % (FLUSH) 0.9 %
10 SYRINGE (ML) INJECTION EVERY 12 HOURS PRN
Status: DISCONTINUED | OUTPATIENT
Start: 2023-04-26 | End: 2023-05-02 | Stop reason: HOSPADM

## 2023-04-26 RX ORDER — HYDRALAZINE HYDROCHLORIDE 20 MG/ML
10 INJECTION INTRAMUSCULAR; INTRAVENOUS EVERY 6 HOURS PRN
Status: DISCONTINUED | OUTPATIENT
Start: 2023-04-26 | End: 2023-05-02 | Stop reason: HOSPADM

## 2023-04-26 RX ORDER — DEXTROSE 40 %
15 GEL (GRAM) ORAL
Status: DISCONTINUED | OUTPATIENT
Start: 2023-04-26 | End: 2023-05-02 | Stop reason: HOSPADM

## 2023-04-26 RX ORDER — PRAVASTATIN SODIUM 10 MG/1
20 TABLET ORAL NIGHTLY
Status: DISCONTINUED | OUTPATIENT
Start: 2023-04-26 | End: 2023-05-02 | Stop reason: HOSPADM

## 2023-04-26 RX ORDER — GLUCAGON 1 MG
1 KIT INJECTION
Status: DISCONTINUED | OUTPATIENT
Start: 2023-04-26 | End: 2023-05-02 | Stop reason: HOSPADM

## 2023-04-26 RX ORDER — INSULIN ASPART 100 [IU]/ML
1-10 INJECTION, SOLUTION INTRAVENOUS; SUBCUTANEOUS EVERY 6 HOURS PRN
Status: DISCONTINUED | OUTPATIENT
Start: 2023-04-26 | End: 2023-04-26

## 2023-04-26 RX ORDER — SIMETHICONE 80 MG
1 TABLET,CHEWABLE ORAL 4 TIMES DAILY PRN
Status: DISCONTINUED | OUTPATIENT
Start: 2023-04-26 | End: 2023-05-02 | Stop reason: HOSPADM

## 2023-04-26 RX ORDER — TALC
6 POWDER (GRAM) TOPICAL NIGHTLY PRN
Status: DISCONTINUED | OUTPATIENT
Start: 2023-04-26 | End: 2023-05-02 | Stop reason: HOSPADM

## 2023-04-26 RX ADMIN — VANCOMYCIN HYDROCHLORIDE 1500 MG: 500 INJECTION, POWDER, LYOPHILIZED, FOR SOLUTION INTRAVENOUS at 02:04

## 2023-04-26 RX ADMIN — INSULIN ASPART 4 UNITS: 100 INJECTION, SOLUTION INTRAVENOUS; SUBCUTANEOUS at 11:04

## 2023-04-26 RX ADMIN — PRAVASTATIN SODIUM 20 MG: 10 TABLET ORAL at 09:04

## 2023-04-26 RX ADMIN — SODIUM CHLORIDE 1000 ML: 9 INJECTION, SOLUTION INTRAVENOUS at 12:04

## 2023-04-26 RX ADMIN — INSULIN DETEMIR 10 UNITS: 100 INJECTION, SOLUTION SUBCUTANEOUS at 09:04

## 2023-04-26 RX ADMIN — PIPERACILLIN AND TAZOBACTAM 4.5 G: 4; .5 INJECTION, POWDER, FOR SOLUTION INTRAVENOUS; PARENTERAL at 09:04

## 2023-04-26 RX ADMIN — PIPERACILLIN AND TAZOBACTAM 4.5 G: 4; .5 INJECTION, POWDER, FOR SOLUTION INTRAVENOUS; PARENTERAL at 12:04

## 2023-04-26 RX ADMIN — HYDROCHLOROTHIAZIDE 25 MG: 25 TABLET ORAL at 02:04

## 2023-04-26 NOTE — ASSESSMENT & PLAN NOTE
04.26.2023  infected right great toe likely osteomyelitis edematous with pus  NPO after midnight for possible right great toe amputation continue IV antibiotics

## 2023-04-26 NOTE — DISCHARGE INSTRUCTIONS
Hospitalist Discharge orders  *Notify your healthcare provider if you experience any of the following: temperature >100.4  * Notify your healthcare provider if you experience any of the following: redness, tenderness.    *Notify your healthcare provider if you experience any of the following: difficulty breathing or     increased cough.  *Notify your physician if you experience any persistent nausea, vomiting, or diarrhea or headache  *Notify your physician if you experience any of the following:severe uncontrolled pain;worsening rash, increased confusion or weakness; dizziness, lightheadedness or visual disturbances    Change dressing daily.  Unpack and repack with Vashe or saline moistened gauze or iodoform packing.  Cover with gauze and tape.

## 2023-04-26 NOTE — H&P (VIEW-ONLY)
[facility]  General Surgery  Consult Note    Patient Name: James Bolaños Jr.  MRN: [id number]  Code Status: Full Code  Admission Date: 4/26/2023  Hospital Length of Stay: 0 days  Attending Physician: Edith Gatica DO  Primary Care Provider: [facility]    Patient information was obtained from ER records.     Inpatient consult to General Surgery  Consult performed by: ZHENG Cabrera  Consult ordered by: Aaron Magaña MD  Assessment/Recommendations: Npo after mn for possible right great toe amp  Continue iv abx         Subjective:     Principal Problem: Diabetic foot ulcer associated with type 2 diabetes mellitus    History of Present Illness: 52-year-old male with diabetes  Previous right 2nd toe amputation per Dr. Tan a year ago  Reports 1 week history of infected right great toe  Admitted to the hospital today with diabetic toe ulcer on IV vancomycin Zosyn  No white count but CRP is elevated  X-ray and clinical exam consistent with osteomyelitis right great toe      No current facility-administered medications on file prior to encounter.     Current Outpatient Medications on File Prior to Encounter   Medication Sig    aspirin (ECOTRIN) 81 MG EC tablet Take 81 mg by mouth once daily.    glimepiride (AMARYL) 4 MG tablet Take 4 mg by mouth before breakfast.    metFORMIN (GLUCOPHAGE) 500 MG tablet Take 500 mg by mouth 2 (two) times daily with meals.    pravastatin (PRAVACHOL) 20 MG tablet Take 20 mg by mouth every evening.    [DISCONTINUED] Lactobacillus acidophilus 500 million cell Cap Take 1 capsule by mouth 3 (three) times daily with meals.    [DISCONTINUED] traMADoL 100 mg Tab Take 50 mg by mouth every 6 (six) hours as needed for Pain.       Review of patient's allergies indicates:  No Known Allergies    Past Medical History:   Diagnosis Date    Diabetes mellitus     Hypertension      Past Surgical History:   Procedure Laterality Date    CHOLECYSTECTOMY       DEBRIDEMENT OF FOOT      DEBRIDEMENT OF LOWER EXTREMITY Right 10/23/2021    Procedure: DEBRIDEMENT, LOWER EXTREMITY;  Surgeon: Fred Tan MD;  Location: [facility] OR;  Service: General;  Laterality: Right;  right foot    JOINT REPLACEMENT      TOE AMPUTATION Right 10/23/2021    Procedure: AMPUTATION, TOE;  Surgeon: Fred Tan MD;  Location: [facility] OR;  Service: General;  Laterality: Right;  second toe     Family History       Problem Relation (Age of Onset)    Diabetes Mother, Father    Heart disease Father          Tobacco Use    Smoking status: Former    Smokeless tobacco: Current     Types: Chew    Tobacco comments:     hasnt smoked since [place]   Substance and Sexual Activity    Alcohol use: Yes     Alcohol/week: 24.0 standard drinks     Types: 24 Cans of beer per week    Drug use: Never    Sexual activity: Yes     Partners: Female     Review of Systems   Skin:  Positive for wound.   Objective:     Vital Signs (Most Recent):  Temp: 98 °F (36.7 °C) (04/26/23 1434)  Pulse: 76 (04/26/23 1434)  Resp: 20 (04/26/23 1434)  BP: 135/74 (04/26/23 1434)  SpO2: 97 % (04/26/23 1434) Vital Signs (24h Range):  Temp:  [98 °F (36.7 °C)] 98 °F (36.7 °C)  Pulse:  [76-86] 76  Resp:  [16-20] 20  SpO2:  [97 %-98 %] 97 %  BP: (135-143)/(74-88) 135/74     Weight: 82.6 kg (182 lb)  Body mass index is 28.51 kg/m².    Physical Exam  Vitals and nursing note reviewed. Exam conducted with a chaperone present.   HENT:      Head: Normocephalic.      Nose: Nose normal.   Eyes:      Conjunctiva/sclera: Conjunctivae normal.   Cardiovascular:      Rate and Rhythm: Normal rate.   Pulmonary:      Effort: Pulmonary effort is normal.   Abdominal:      General: Abdomen is flat.      Palpations: Abdomen is soft.   Skin:     General: Skin is warm and dry.      Capillary Refill: Capillary refill takes less than 2 seconds.      Comments: Right great toe edematous with ulcer and express some pus cellulitis dorsum of foot    Neurological:      Mental Status: He is alert and oriented to person, place, and time.   Psychiatric:         Mood and Affect: Mood normal.       Significant Labs:  I have reviewed all pertinent lab results within the past 24 hours.  CBC:   Recent Labs   Lab 04/26/23  1232   WBC 7.90   RBC 4.19*   HGB 12.7*   HCT 37.7*      MCV 90.0   MCH 30.3   MCHC 33.7     BMP:   Recent Labs   Lab 04/26/23  1232   *   *   K 3.6      CO2 28   BUN 10   CREATININE 0.81   CALCIUM 9.0     CMP:   Recent Labs   Lab 04/26/23  1232   *   CALCIUM 9.0   ALBUMIN 2.9*   PROT 8.5*   *   K 3.6   CO2 28      BUN 10   CREATININE 0.81   ALKPHOS 104   ALT 15*   AST 8*   BILITOT 0.6     LFTs:   Recent Labs   Lab 04/26/23  1232   ALT 15*   AST 8*   ALKPHOS 104   BILITOT 0.6   PROT 8.5*   ALBUMIN 2.9*       Significant Diagnostics:  I have reviewed all pertinent imaging results/findings within the past 24 hours.      Assessment/Plan:     * Diabetic foot ulcer associated with type 2 diabetes mellitus  04.26.2023  infected right great toe likely osteomyelitis edematous with pus  NPO after midnight for possible right great toe amputation continue IV antibiotics      VTE Risk Mitigation (From admission, onward)         Ordered     IP VTE LOW RISK PATIENT  Once         04/26/23 1346     Place sequential compression device  Until discontinued         04/26/23 1346                Thank you for your consult. I will follow-up with patient. Please contact us if you have any additional questions.    Alyse Jones, JODYP  General Surgery  [facility]

## 2023-04-26 NOTE — HPI
"Patient is a 51 yo M with a PMH of HTN and T2DM presenting to [facility] via EMS from [facility] for right big toe ulcer and swelling. Pt reports that the swelling and ulcer have been bothering him for about 1 week. He states he thought it was just a blister from wearing his boots, but he took his boot off a few days ago and states his foot "didn't feel right". Pt denies any pain but does state it throbs. Pt also reports redness to his big toe and foot. Pt reports polyuria, but denies fever, chills, chest pain, SOB, abdominal pain.N/V, constipation, diarrhea, dysuria.    The patient had the second toe on his right foot amputated in October 2021 with Dr. Tan. Pt is supposed to take aspirin, pravastatin, metformin, and insulin but he admits he has not being consistently compliant with his medications. Pt also notes he does not check his blood sugars at home.    In the ED, initial presenting vitals were /86, HR 86, RR 16, T 98 °F, Sp)2 98% on RA. Labs demonstrated: WBC 7.9, Hgb 12.7, Hct 37.7, Plts 383, Na 135, K 3.6, BUN 10, Cr 0.81, Glucose 249, , Alb 2.9, Lactic Acid 0.8, AST 8, ALT 15. The patient was given IV NS 1L bolus x1, Zosyn 4.5g IV x1 and Vancomycin 1,500 mg IV x1.     The patient was admitted to the [facility] family medicine service under the direct supervision of Dr. En DO for the continued care and medical management of this patient.  "

## 2023-04-26 NOTE — ED PROVIDER NOTES
"Encounter Date: 4/26/2023    SCRIBE #1 NOTE: I, Birgit Fair, am scribing for, and in the presence of,  Willy Acuña MD. I have scribed the entire note.     History     Chief Complaint   Patient presents with    Wound Infection     Pt sent from [facility] in [place] for a wound infection on the right foot. Pt has had previous toe amputation on that foot.      The patient is a 52 y.o. male who presents to the emergency department via EMS from seeing Dr. Simmons [facility] for swollen right big toe. He states that his toe has been bothering him for about a week. He thought it was an abrasion at first, but one day he took off his boot and his foot "didn't feel right." He states that it does not feel very painful but it does throb. The patient had the second toe on his right foot amputated in October 2021. He is supposed to take aspirin, metformin, and insulin. The patient admits to not being consistently compliant with his medications since leaving the hospital after his toe amputation. He uses smokeless tobacco. There are no other complaints in the ED at this time.    The history is provided by the patient. No  was used.   Review of patient's allergies indicates:  No Known Allergies  Past Medical History:   Diagnosis Date    Diabetes mellitus     Hypertension      Past Surgical History:   Procedure Laterality Date    CHOLECYSTECTOMY      DEBRIDEMENT OF FOOT      DEBRIDEMENT OF LOWER EXTREMITY Right 10/23/2021    Procedure: DEBRIDEMENT, LOWER EXTREMITY;  Surgeon: Fred Tan MD;  Location: [facility] OR;  Service: General;  Laterality: Right;  right foot    DEBRIDEMENT, PHALANX, FOOT Right 4/27/2023    Procedure: DEBRIDEMENT, PHALANX, FOOT;  Surgeon: Fred Tan MD;  Location[facility] OR;  Service: General;  Laterality: Right;    JOINT REPLACEMENT      TOE AMPUTATION Right 10/23/2021    Procedure: AMPUTATION, TOE;  Surgeon: Fred Tan MD;  Location[facility] OR;  " Service: General;  Laterality: Right;  second toe     Family History   Problem Relation Age of Onset    Diabetes Mother     Diabetes Father     Heart disease Father      Social History     Tobacco Use    Smoking status: Former    Smokeless tobacco: Current     Types: Chew    Tobacco comments:     hasnt smoked since highschool   Substance Use Topics    Alcohol use: Yes     Alcohol/week: 24.0 standard drinks     Types: 24 Cans of beer per week    Drug use: Never     Review of Systems   Constitutional: Negative.    Eyes: Negative.    Endocrine: Negative.    Skin:  Positive for wound.   Allergic/Immunologic: Negative.    All other systems reviewed and are negative.    Physical Exam     Initial Vitals [04/26/23 1206]   BP Pulse Resp Temp SpO2   (!) 143/88 86 16 98 °F (36.7 °C) 98 %      MAP       --         Physical Exam    Nursing note and vitals reviewed.  Constitutional: He appears well-developed and well-nourished.   HENT:   Head: Normocephalic and atraumatic.   Eyes: Conjunctivae and EOM are normal. Pupils are equal, round, and reactive to light.   Cardiovascular:  Normal rate, regular rhythm and normal heart sounds.           Pulses:       Dorsalis pedis pulses are 2+ on the right side.        Posterior tibial pulses are 2+ on the right side.   Pulmonary/Chest: Breath sounds normal.   Musculoskeletal:      Right Lower Extremity: (amputation of 2nd toe on right foot)    Neurological: He is alert and oriented to person, place, and time.   Skin: Skin is warm and dry.   Swollen right big toe. Ulcer on right big toe.   Psychiatric: He has a normal mood and affect. His behavior is normal. Judgment and thought content normal.       ED Course   Procedures  Labs Reviewed   COMPREHENSIVE METABOLIC PANEL - Abnormal; Notable for the following components:       Result Value    Sodium 135 (*)     Glucose 249 (*)     Total Protein 8.5 (*)     Albumin 2.9 (*)     Globulin 5.6 (*)     ALT 15 (*)     AST 8 (*)     All other  components within normal limits   CBC WITH DIFFERENTIAL - Abnormal; Notable for the following components:    RBC 4.19 (*)     Hemoglobin 12.7 (*)     Hematocrit 37.7 (*)     Lymphocytes % 26.8 (*)     Monocytes % 6.7 (*)     All other components within normal limits   CBC W/ AUTO DIFFERENTIAL    Narrative:     The following orders were created for panel order CBC auto differential.  Procedure                               Abnormality         Status                     ---------                               -----------         ------                     CBC with Differential[[id number]]        Abnormal            Final result                 Please view results for these tests on the individual orders.          Imaging Results    None          Medications   aspirin EC tablet 81 mg (81 mg Oral Given 4/30/23 0941)   pravastatin tablet 20 mg (20 mg Oral Given 4/29/23 2140)   sodium chloride 0.9% flush 10 mL (has no administration in time range)   naloxone 0.4 mg/mL injection 0.02 mg (has no administration in time range)   glucagon (human recombinant) injection 1 mg (has no administration in time range)   dextrose 40 % gel 15,000 mg (has no administration in time range)   ondansetron injection 4 mg (has no administration in time range)   promethazine tablet 25 mg (has no administration in time range)   melatonin tablet 6 mg (has no administration in time range)   simethicone chewable tablet 80 mg (has no administration in time range)   dextrose 10% bolus 250 mL 250 mL (has no administration in time range)   hydroCHLOROthiazide tablet 25 mg (25 mg Oral Given 4/30/23 0941)   glucagon (human recombinant) injection 1 mg (has no administration in time range)   insulin aspart U-100 injection 1-10 Units (6 Units Subcutaneous Given 4/29/23 1336)   hydrALAZINE injection 10 mg (has no administration in time range)   vancomycin (VANCOCIN) 1,500 mg in dextrose 5 % (D5W) 250 mL IVPB (1,500 mg Intravenous Not Given 4/28/23 1500)    morphine injection 4 mg (4 mg Intramuscular Given 4/27/23 1326)   insulin detemir U-100 injection 25 Units (25 Units Subcutaneous Given 4/29/23 2140)   cefTRIAXone (ROCEPHIN) 2 g in dextrose 5 % in water (D5W) 5 % 50 mL IVPB (MB+) (0 g Intravenous Stopped 4/30/23 1243)   insulin aspart U-100 injection 7 Units (7 Units Subcutaneous Given 4/30/23 1213)   sodium chloride 0.9% bolus 1,000 mL 1,000 mL (0 mLs Intravenous Stopped 4/26/23 1345)   piperacillin-tazobactam (ZOSYN) 4.5 g in dextrose 5 % in water (D5W) 5 % 100 mL IVPB (MB+) (0 g Intravenous Stopped 4/26/23 1321)   vancomycin (VANCOCIN) 1,500 mg in dextrose 5 % (D5W) 250 mL IVPB (0 mg Intravenous Stopped 4/26/23 1618)   piperacillin-tazobactam (ZOSYN) 4.5 g in dextrose 5 % in water (D5W) 5 % 100 mL IVPB (MB+) (0 g Intravenous Stopped 4/28/23 1425)   insulin regular injection 8 Units 0.08 mL (8 Units Intravenous Given 4/27/23 0822)   HYDROcodone-acetaminophen 7.5-325 mg per tablet 1 tablet (1 tablet Oral Given 4/29/23 1043)     Medical Decision Making:   ED Management:  MDM    Patient presents for emergent evaluation of acute diabetic foot wound noncompliance with insulin that poses a threat to life and/or bodily function.    In the ED patient found to have acute hyperglycemia diabetic foot wound.    I ordered labs and personally reviewed them.  Labs significant for hyperglycemia.  Creatinine normal..      Admission [facility]  I discussed the patient presentation labs, ekg, X-rays, CT findings with the consultant for hospital medicine and General surgery (speciality).    Patient was managed in the ED with IV antibiotics normal saline.    The response to treatment was stable.    Patient required emergent consultation to Hospital Medicine (admitting physician) for admission.           Attending Attestation:           Physician Attestation for Scribe:  Physician Attestation Statement for Scribe #1: I, Willy Acuña MD, reviewed documentation, as scribed by Birgit  Marv in my presence, and it is both accurate and complete.           ED Course as of 04/30/23 1452   Wed Apr 26, 2023   1215 Dorsalis pulse pulses 2+.  Do not suspect acutely significant peripheral arterial disease.  Will treat with broad-spectrum antibiotics and admit to hospital medicine [PK]   1253 D [PK]      ED Course User Index  [PK] Willy Acuña MD                 Clinical Impression:   Final diagnoses:  [I96] Gangrene of right foot (Primary)  [Z91.148] Noncompliance with medication regimen  [I96] Gangrene of toe  [E11.621, L97.526] Diabetic ulcer of toe of left foot associated with type 2 diabetes mellitus, with bone involvement without evidence of necrosis        ED Disposition Condition    Admit Stable                Willy Acuña MD  04/30/23 1451

## 2023-04-26 NOTE — HPI
52-year-old male with diabetes  Previous right 2nd toe amputation per Dr. Tan a year ago  Reports 1 week history of infected right great toe  Admitted to the hospital today with diabetic toe ulcer on IV vancomycin Zosyn  No white count but CRP is elevated  X-ray and clinical exam consistent with osteomyelitis right great toe

## 2023-04-26 NOTE — ASSESSMENT & PLAN NOTE
Patient's FSGs are uncontrolled due to hyperglycemia on current medication regimen.  Last A1c reviewed-   Lab Results   Component Value Date    HGBA1C 9.8 (H) 10/22/2021     Most recent fingerstick glucose reviewed- No results for input(s): POCTGLUCOSE in the last 24 hours.  Current correctional scale  Medium  Maintain anti-hyperglycemic dose as follows-   Antihyperglycemics (From admission, onward)    Start     Stop Route Frequency Ordered    04/26/23 2100  insulin detemir U-100 injection 10 Units         -- SubQ Nightly 04/26/23 1346    04/26/23 1455  insulin aspart U-100 injection 1-10 Units         -- SubQ Every 6 hours PRN 04/26/23 1355        Hold Oral hypoglycemics while patient is in the hospital.    Patient has not been complaint with medication and diet  Surgery Consulted thank them for their assistance  Wound Care consulted thank them for their assistance  Wound Culture and Blood cultures pending  ESR, CRP pending  Xray of Right foot pending  Vancomycin pharmacy to Dose  Zosyn 4.45 Q8hr will monitor cultures

## 2023-04-26 NOTE — H&P
"[facility]  Hospital Medicine  History & Physical    Patient Name: James Bolaños Jr.  MRN: [id number]  Patient Class: IP- Inpatient  Admission Date: 4/26/2023  Attending Physician: Edith Gatica DO   Primary Care Provider: [facility]         Patient information was obtained from patient and ER records.     Subjective:     Principal Problem:Diabetic foot ulcer associated with type 2 diabetes mellitus    Chief Complaint:   Chief Complaint   Patient presents with    Wound Infection     Pt sent from [facility] in [place] for a wound infection on the right foot. Pt has had previous toe amputation on that foot.         HPI: Patient is a 53 yo M with a PMH of HTN and T2DM presenting to [facility] ED via EMS from [facility] for right big toe ulcer and swelling. Pt reports that the swelling and ulcer have been bothering him for about 1 week. He states he thought it was just a blister from wearing his boots, but he took his boot off a few days ago and states his foot "didn't feel right". Pt denies any pain but does state it throbs. Pt also reports redness to his big toe and foot. Pt reports polyuria, but denies fever, chills, chest pain, SOB, abdominal pain.N/V, constipation, diarrhea, dysuria.    The patient had the second toe on his right foot amputated in October 2021 with Dr. Tan. Pt is supposed to take aspirin, pravastatin, metformin, and insulin but he admits he has not being consistently compliant with his medications. Pt also notes he does not check his blood sugars at home.    In the ED, initial presenting vitals were /86, HR 86, RR 16, T 98 °F, Sp)2 98% on RA. Labs demonstrated: WBC 7.9, Hgb 12.7, Hct 37.7, Plts 383, Na 135, K 3.6, BUN 10, Cr 0.81, Glucose 249, , Alb 2.9, Lactic Acid 0.8, AST 8, ALT 15. The patient was given IV NS 1L bolus x1, Zosyn 4.5g IV x1 and Vancomycin 1,500 mg IV x1.     The patient was admitted to the [facility] " family medicine service under the direct supervision of Dr. En DO for the continued care and medical management of this patient.      Past Medical History:   Diagnosis Date    Diabetes mellitus     Hypertension        Past Surgical History:   Procedure Laterality Date    CHOLECYSTECTOMY      DEBRIDEMENT OF FOOT      DEBRIDEMENT OF LOWER EXTREMITY Right 10/23/2021    Procedure: DEBRIDEMENT, LOWER EXTREMITY;  Surgeon: Fred Tan MD;  Location: [facility];  Service: General;  Laterality: Right;  right foot    JOINT REPLACEMENT      TOE AMPUTATION Right 10/23/2021    Procedure: AMPUTATION, TOE;  Surgeon: Fred Tan MD;  Location: [facility] OR;  Service: General;  Laterality: Right;  second toe       Review of patient's allergies indicates:  No Known Allergies    No current facility-administered medications on file prior to encounter.     Current Outpatient Medications on File Prior to Encounter   Medication Sig    aspirin (ECOTRIN) 81 MG EC tablet Take 81 mg by mouth once daily.    glimepiride (AMARYL) 4 MG tablet Take 4 mg by mouth before breakfast.    metFORMIN (GLUCOPHAGE) 500 MG tablet Take 500 mg by mouth 2 (two) times daily with meals.    pravastatin (PRAVACHOL) 20 MG tablet Take 20 mg by mouth every evening.    [DISCONTINUED] Lactobacillus acidophilus 500 million cell Cap Take 1 capsule by mouth 3 (three) times daily with meals.    [DISCONTINUED] traMADoL 100 mg Tab Take 50 mg by mouth every 6 (six) hours as needed for Pain.     Family History       Problem Relation (Age of Onset)    Diabetes Mother, Father    Heart disease Father          Tobacco Use    Smoking status: Former    Smokeless tobacco: Current     Types: Chew    Tobacco comments:     hasnt smoked since highschool   Substance and Sexual Activity    Alcohol use: Yes     Alcohol/week: 24.0 standard drinks     Types: 24 Cans of beer per week    Drug use: Never    Sexual activity: Yes     Partners: Female     Review of  Systems   Constitutional:  Negative for fatigue and fever.   HENT:  Negative for ear discharge, ear pain, facial swelling, sinus pressure, sinus pain and sneezing.    Eyes:  Negative for pain, discharge and itching.   Respiratory:  Negative for cough and shortness of breath.    Cardiovascular: Negative.    Gastrointestinal:  Negative for abdominal distention, abdominal pain, anal bleeding, constipation, diarrhea and nausea.   Endocrine: Negative.    Genitourinary:  Negative for difficulty urinating, flank pain and frequency.   Musculoskeletal:  Positive for arthralgias.   Skin:  Positive for wound.   Allergic/Immunologic: Negative.    Neurological:  Negative for light-headedness and headaches.   Psychiatric/Behavioral: Negative.     Objective:     Vital Signs (Most Recent):  Temp: 98 °F (36.7 °C) (04/26/23 1206)  Pulse: 86 (04/26/23 1206)  Resp: 16 (04/26/23 1206)  BP: (!) 143/88 (04/26/23 1206)  SpO2: 98 % (04/26/23 1206)   Vital Signs (24h Range):  Temp:  [98 °F (36.7 °C)] 98 °F (36.7 °C)  Pulse:  [86] 86  Resp:  [16] 16  SpO2:  [98 %] 98 %  BP: (143)/(88) 143/88     Weight: 82.6 kg (182 lb)  Body mass index is 28.51 kg/m².    Physical Exam  Constitutional:       Appearance: Normal appearance. He is normal weight.   HENT:      Head: Normocephalic and atraumatic.      Right Ear: Tympanic membrane normal.      Left Ear: Tympanic membrane normal.      Nose: Nose normal.      Mouth/Throat:      Mouth: Mucous membranes are moist.      Pharynx: Oropharynx is clear.   Eyes:      Conjunctiva/sclera: Conjunctivae normal.      Pupils: Pupils are equal, round, and reactive to light.   Cardiovascular:      Rate and Rhythm: Normal rate and regular rhythm.      Pulses: Normal pulses.      Heart sounds: Normal heart sounds.   Pulmonary:      Effort: Pulmonary effort is normal.      Breath sounds: Normal breath sounds.   Abdominal:      General: Abdomen is flat. Bowel sounds are normal.   Musculoskeletal:         General:  Deformity present. Normal range of motion.      Cervical back: Normal range of motion.      Comments: Right great toe ulcer with erythema and swelling  Second toe on R amputated   Skin:     General: Skin is warm and dry.      Capillary Refill: Capillary refill takes less than 2 seconds.   Neurological:      Mental Status: He is alert and oriented to person, place, and time.   Psychiatric:         Mood and Affect: Mood normal.         CRANIAL NERVES     CN III, IV, VI   Pupils are equal, round, and reactive to light.     Significant Labs: All pertinent labs within the past 24 hours have been reviewed.  CBC:   Recent Labs   Lab 04/26/23  1232   WBC 7.90   HGB 12.7*   HCT 37.7*        CMP:   Recent Labs   Lab 04/26/23  1232   *   K 3.6      CO2 28   *   BUN 10   CREATININE 0.81   CALCIUM 9.0   PROT 8.5*   ALBUMIN 2.9*   BILITOT 0.6   ALKPHOS 104   AST 8*   ALT 15*   ANIONGAP 11       Significant Imaging: I have reviewed all pertinent imaging results/findings within the past 24 hours.    Assessment/Plan:     * Diabetic foot ulcer associated with type 2 diabetes mellitus  Patient's FSGs are uncontrolled due to hyperglycemia on current medication regimen.  Last A1c reviewed-   Lab Results   Component Value Date    HGBA1C 9.8 (H) 10/22/2021     Most recent fingerstick glucose reviewed- No results for input(s): POCTGLUCOSE in the last 24 hours.  Current correctional scale  Medium  Maintain anti-hyperglycemic dose as follows-   Antihyperglycemics (From admission, onward)    Start     Stop Route Frequency Ordered    04/26/23 2100  insulin detemir U-100 injection 10 Units         -- SubQ Nightly 04/26/23 1346    04/26/23 1455  insulin aspart U-100 injection 1-10 Units         -- SubQ Every 6 hours PRN 04/26/23 1355        Hold Oral hypoglycemics while patient is in the hospital.    Patient has not been complaint with medication and diet  Surgery Consulted thank them for their assistance  Wound Care  consulted thank them for their assistance  Wound Culture and Blood cultures pending  ESR, CRP pending  Xray of Right foot pending  Vancomycin pharmacy to Dose  Zosyn 4.45 Q8hr will monitor cultures      Hypertension  Patient has not taking any medication in months  Start HCTZ 25 mg daily  Hydralazine 10 mg IVP PRN        Hyperlipidemia  Pravastatin 20 mg  Aspirin 81 mg        VTE Risk Mitigation (From admission, onward)         Ordered     IP VTE LOW RISK PATIENT  Once         04/26/23 1346     Place sequential compression device  Until discontinued         04/26/23 1346                           Aaron Magaña MD  Department of Hospital Medicine  [facility]

## 2023-04-26 NOTE — SUBJECTIVE & OBJECTIVE
No current facility-administered medications on file prior to encounter.     Current Outpatient Medications on File Prior to Encounter   Medication Sig    aspirin (ECOTRIN) 81 MG EC tablet Take 81 mg by mouth once daily.    glimepiride (AMARYL) 4 MG tablet Take 4 mg by mouth before breakfast.    metFORMIN (GLUCOPHAGE) 500 MG tablet Take 500 mg by mouth 2 (two) times daily with meals.    pravastatin (PRAVACHOL) 20 MG tablet Take 20 mg by mouth every evening.    [DISCONTINUED] Lactobacillus acidophilus 500 million cell Cap Take 1 capsule by mouth 3 (three) times daily with meals.    [DISCONTINUED] traMADoL 100 mg Tab Take 50 mg by mouth every 6 (six) hours as needed for Pain.       Review of patient's allergies indicates:  No Known Allergies    Past Medical History:   Diagnosis Date    Diabetes mellitus     Hypertension      Past Surgical History:   Procedure Laterality Date    CHOLECYSTECTOMY      DEBRIDEMENT OF FOOT      DEBRIDEMENT OF LOWER EXTREMITY Right 10/23/2021    Procedure: DEBRIDEMENT, LOWER EXTREMITY;  Surgeon: Fred Tan MD;  Location: [facility];  Service: General;  Laterality: Right;  right foot    JOINT REPLACEMENT      TOE AMPUTATION Right 10/23/2021    Procedure: AMPUTATION, TOE;  Surgeon: Fred Tan MD;  Location: [facility];  Service: General;  Laterality: Right;  second toe     Family History       Problem Relation (Age of Onset)    Diabetes Mother, Father    Heart disease Father          Tobacco Use    Smoking status: Former    Smokeless tobacco: Current     Types: Chew    Tobacco comments:     hasnt smoked since highschool   Substance and Sexual Activity    Alcohol use: Yes     Alcohol/week: 24.0 standard drinks     Types: 24 Cans of beer per week    Drug use: Never    Sexual activity: Yes     Partners: Female     Review of Systems   Skin:  Positive for wound.   Objective:     Vital Signs (Most Recent):  Temp: 98 °F (36.7 °C) (04/26/23 1434)  Pulse: 76 (04/26/23 1434)  Resp: 20  (04/26/23 1434)  BP: 135/74 (04/26/23 1434)  SpO2: 97 % (04/26/23 1434) Vital Signs (24h Range):  Temp:  [98 °F (36.7 °C)] 98 °F (36.7 °C)  Pulse:  [76-86] 76  Resp:  [16-20] 20  SpO2:  [97 %-98 %] 97 %  BP: (135-143)/(74-88) 135/74     Weight: 82.6 kg (182 lb)  Body mass index is 28.51 kg/m².    Physical Exam  Vitals and nursing note reviewed. Exam conducted with a chaperone present.   HENT:      Head: Normocephalic.      Nose: Nose normal.   Eyes:      Conjunctiva/sclera: Conjunctivae normal.   Cardiovascular:      Rate and Rhythm: Normal rate.   Pulmonary:      Effort: Pulmonary effort is normal.   Abdominal:      General: Abdomen is flat.      Palpations: Abdomen is soft.   Skin:     General: Skin is warm and dry.      Capillary Refill: Capillary refill takes less than 2 seconds.      Comments: Right great toe edematous with ulcer and express some pus cellulitis dorsum of foot   Neurological:      Mental Status: He is alert and oriented to person, place, and time.   Psychiatric:         Mood and Affect: Mood normal.       Significant Labs:  I have reviewed all pertinent lab results within the past 24 hours.  CBC:   Recent Labs   Lab 04/26/23  1232   WBC 7.90   RBC 4.19*   HGB 12.7*   HCT 37.7*      MCV 90.0   MCH 30.3   MCHC 33.7     BMP:   Recent Labs   Lab 04/26/23  1232   *   *   K 3.6      CO2 28   BUN 10   CREATININE 0.81   CALCIUM 9.0     CMP:   Recent Labs   Lab 04/26/23  1232   *   CALCIUM 9.0   ALBUMIN 2.9*   PROT 8.5*   *   K 3.6   CO2 28      BUN 10   CREATININE 0.81   ALKPHOS 104   ALT 15*   AST 8*   BILITOT 0.6     LFTs:   Recent Labs   Lab 04/26/23  1232   ALT 15*   AST 8*   ALKPHOS 104   BILITOT 0.6   PROT 8.5*   ALBUMIN 2.9*       Significant Diagnostics:  I have reviewed all pertinent imaging results/findings within the past 24 hours.

## 2023-04-26 NOTE — CONSULTS
[facility]  General Surgery  Consult Note    Patient Name: James Bolaños Jr.  MRN: [id number]  Code Status: Full Code  Admission Date: 4/26/2023  Hospital Length of Stay: 0 days  Attending Physician: Edith Gatica DO  Primary Care Provider: [facility]    Patient information was obtained from ER records.     Inpatient consult to General Surgery  Consult performed by: ZHENG Cabrera  Consult ordered by: Aaron Magaña MD  Assessment/Recommendations: Npo after mn for possible right great toe amp  Continue iv abx         Subjective:     Principal Problem: Diabetic foot ulcer associated with type 2 diabetes mellitus    History of Present Illness: 52-year-old male with diabetes  Previous right 2nd toe amputation per Dr. Tan a year ago  Reports 1 week history of infected right great toe  Admitted to the hospital today with diabetic toe ulcer on IV vancomycin Zosyn  No white count but CRP is elevated  X-ray and clinical exam consistent with osteomyelitis right great toe      No current facility-administered medications on file prior to encounter.     Current Outpatient Medications on File Prior to Encounter   Medication Sig    aspirin (ECOTRIN) 81 MG EC tablet Take 81 mg by mouth once daily.    glimepiride (AMARYL) 4 MG tablet Take 4 mg by mouth before breakfast.    metFORMIN (GLUCOPHAGE) 500 MG tablet Take 500 mg by mouth 2 (two) times daily with meals.    pravastatin (PRAVACHOL) 20 MG tablet Take 20 mg by mouth every evening.    [DISCONTINUED] Lactobacillus acidophilus 500 million cell Cap Take 1 capsule by mouth 3 (three) times daily with meals.    [DISCONTINUED] traMADoL 100 mg Tab Take 50 mg by mouth every 6 (six) hours as needed for Pain.       Review of patient's allergies indicates:  No Known Allergies    Past Medical History:   Diagnosis Date    Diabetes mellitus     Hypertension      Past Surgical History:   Procedure Laterality Date    CHOLECYSTECTOMY       DEBRIDEMENT OF FOOT      DEBRIDEMENT OF LOWER EXTREMITY Right 10/23/2021    Procedure: DEBRIDEMENT, LOWER EXTREMITY;  Surgeon: Fred Tan MD;  Location: [facility] OR;  Service: General;  Laterality: Right;  right foot    JOINT REPLACEMENT      TOE AMPUTATION Right 10/23/2021    Procedure: AMPUTATION, TOE;  Surgeon: Fred Tan MD;  Location: [facility] OR;  Service: General;  Laterality: Right;  second toe     Family History       Problem Relation (Age of Onset)    Diabetes Mother, Father    Heart disease Father          Tobacco Use    Smoking status: Former    Smokeless tobacco: Current     Types: Chew    Tobacco comments:     hasnt smoked since [place]   Substance and Sexual Activity    Alcohol use: Yes     Alcohol/week: 24.0 standard drinks     Types: 24 Cans of beer per week    Drug use: Never    Sexual activity: Yes     Partners: Female     Review of Systems   Skin:  Positive for wound.   Objective:     Vital Signs (Most Recent):  Temp: 98 °F (36.7 °C) (04/26/23 1434)  Pulse: 76 (04/26/23 1434)  Resp: 20 (04/26/23 1434)  BP: 135/74 (04/26/23 1434)  SpO2: 97 % (04/26/23 1434) Vital Signs (24h Range):  Temp:  [98 °F (36.7 °C)] 98 °F (36.7 °C)  Pulse:  [76-86] 76  Resp:  [16-20] 20  SpO2:  [97 %-98 %] 97 %  BP: (135-143)/(74-88) 135/74     Weight: 82.6 kg (182 lb)  Body mass index is 28.51 kg/m².    Physical Exam  Vitals and nursing note reviewed. Exam conducted with a chaperone present.   HENT:      Head: Normocephalic.      Nose: Nose normal.   Eyes:      Conjunctiva/sclera: Conjunctivae normal.   Cardiovascular:      Rate and Rhythm: Normal rate.   Pulmonary:      Effort: Pulmonary effort is normal.   Abdominal:      General: Abdomen is flat.      Palpations: Abdomen is soft.   Skin:     General: Skin is warm and dry.      Capillary Refill: Capillary refill takes less than 2 seconds.      Comments: Right great toe edematous with ulcer and express some pus cellulitis dorsum of foot    Neurological:      Mental Status: He is alert and oriented to person, place, and time.   Psychiatric:         Mood and Affect: Mood normal.       Significant Labs:  I have reviewed all pertinent lab results within the past 24 hours.  CBC:   Recent Labs   Lab 04/26/23  1232   WBC 7.90   RBC 4.19*   HGB 12.7*   HCT 37.7*      MCV 90.0   MCH 30.3   MCHC 33.7     BMP:   Recent Labs   Lab 04/26/23  1232   *   *   K 3.6      CO2 28   BUN 10   CREATININE 0.81   CALCIUM 9.0     CMP:   Recent Labs   Lab 04/26/23  1232   *   CALCIUM 9.0   ALBUMIN 2.9*   PROT 8.5*   *   K 3.6   CO2 28      BUN 10   CREATININE 0.81   ALKPHOS 104   ALT 15*   AST 8*   BILITOT 0.6     LFTs:   Recent Labs   Lab 04/26/23  1232   ALT 15*   AST 8*   ALKPHOS 104   BILITOT 0.6   PROT 8.5*   ALBUMIN 2.9*       Significant Diagnostics:  I have reviewed all pertinent imaging results/findings within the past 24 hours.      Assessment/Plan:     * Diabetic foot ulcer associated with type 2 diabetes mellitus  04.26.2023  infected right great toe likely osteomyelitis edematous with pus  NPO after midnight for possible right great toe amputation continue IV antibiotics      VTE Risk Mitigation (From admission, onward)         Ordered     IP VTE LOW RISK PATIENT  Once         04/26/23 1346     Place sequential compression device  Until discontinued         04/26/23 1346                Thank you for your consult. I will follow-up with patient. Please contact us if you have any additional questions.    Alyse Jones, JODYP  General Surgery  [facility]

## 2023-04-26 NOTE — PROGRESS NOTES
04/26/23 1652   Wound Care Follow Up   Wound Care Follow-up? No     Surgery following possible amputation 04/27 per ZHENG Pulido note of 04/26.    Please re-consult as needed.    Thank you.

## 2023-04-26 NOTE — ASSESSMENT & PLAN NOTE
Patient has not taking any medication in months  Start HCTZ 25 mg daily  Hydralazine 10 mg IVP PRN

## 2023-04-27 ENCOUNTER — ANESTHESIA EVENT (OUTPATIENT)
Dept: SURGERY | Facility: HOSPITAL | Age: 53
DRG: 629 | End: 2023-04-27
Payer: COMMERCIAL

## 2023-04-27 ENCOUNTER — ANESTHESIA (OUTPATIENT)
Dept: SURGERY | Facility: HOSPITAL | Age: 53
DRG: 629 | End: 2023-04-27
Payer: COMMERCIAL

## 2023-04-27 LAB
ALBUMIN SERPL BCP-MCNC: 2.9 G/DL (ref 3.5–5)
ALBUMIN/GLOB SERPL: 0.5 {RATIO}
ALP SERPL-CCNC: 106 U/L (ref 45–115)
ALT SERPL W P-5'-P-CCNC: 15 U/L (ref 16–61)
ANION GAP SERPL CALCULATED.3IONS-SCNC: 11 MMOL/L (ref 7–16)
AST SERPL W P-5'-P-CCNC: 14 U/L (ref 15–37)
BASOPHILS # BLD AUTO: 0.08 K/UL (ref 0–0.2)
BASOPHILS NFR BLD AUTO: 1.1 % (ref 0–1)
BILIRUB SERPL-MCNC: 0.4 MG/DL (ref ?–1.2)
BUN SERPL-MCNC: 7 MG/DL (ref 7–18)
BUN/CREAT SERPL: 8 (ref 6–20)
CALCIUM SERPL-MCNC: 9.5 MG/DL (ref 8.5–10.1)
CHLORIDE SERPL-SCNC: 99 MMOL/L (ref 98–107)
CO2 SERPL-SCNC: 30 MMOL/L (ref 21–32)
CREAT SERPL-MCNC: 0.88 MG/DL (ref 0.7–1.3)
DIFFERENTIAL METHOD BLD: ABNORMAL
EGFR (NO RACE VARIABLE) (RUSH/TITUS): 103 ML/MIN/1.73M²
EOSINOPHIL # BLD AUTO: 0.23 K/UL (ref 0–0.5)
EOSINOPHIL NFR BLD AUTO: 3.1 % (ref 1–4)
ERYTHROCYTE [DISTWIDTH] IN BLOOD BY AUTOMATED COUNT: 12.4 % (ref 11.5–14.5)
GLOBULIN SER-MCNC: 5.6 G/DL (ref 2–4)
GLUCOSE SERPL-MCNC: 111 MG/DL (ref 70–105)
GLUCOSE SERPL-MCNC: 162 MG/DL (ref 70–105)
GLUCOSE SERPL-MCNC: 216 MG/DL (ref 70–105)
GLUCOSE SERPL-MCNC: 238 MG/DL (ref 70–105)
GLUCOSE SERPL-MCNC: 238 MG/DL (ref 74–106)
GLUCOSE SERPL-MCNC: 262 MG/DL (ref 70–105)
GLUCOSE SERPL-MCNC: 283 MG/DL (ref 70–105)
HCT VFR BLD AUTO: 38.3 % (ref 40–54)
HGB BLD-MCNC: 13.2 G/DL (ref 13.5–18)
IMM GRANULOCYTES # BLD AUTO: 0.03 K/UL (ref 0–0.04)
IMM GRANULOCYTES NFR BLD: 0.4 % (ref 0–0.4)
LYMPHOCYTES # BLD AUTO: 2.18 K/UL (ref 1–4.8)
LYMPHOCYTES NFR BLD AUTO: 29 % (ref 27–41)
MAGNESIUM SERPL-MCNC: 2.1 MG/DL (ref 1.7–2.3)
MCH RBC QN AUTO: 30.6 PG (ref 27–31)
MCHC RBC AUTO-ENTMCNC: 34.5 G/DL (ref 32–36)
MCV RBC AUTO: 88.7 FL (ref 80–96)
MONOCYTES # BLD AUTO: 0.59 K/UL (ref 0–0.8)
MONOCYTES NFR BLD AUTO: 7.9 % (ref 2–6)
MPC BLD CALC-MCNC: 10.2 FL (ref 9.4–12.4)
NEUTROPHILS # BLD AUTO: 4.4 K/UL (ref 1.8–7.7)
NEUTROPHILS NFR BLD AUTO: 58.5 % (ref 53–65)
NRBC # BLD AUTO: 0 X10E3/UL
NRBC, AUTO (.00): 0 %
PLATELET # BLD AUTO: 400 K/UL (ref 150–400)
POTASSIUM SERPL-SCNC: 3.5 MMOL/L (ref 3.5–5.1)
PROT SERPL-MCNC: 8.5 G/DL (ref 6.4–8.2)
RBC # BLD AUTO: 4.32 M/UL (ref 4.6–6.2)
SODIUM SERPL-SCNC: 136 MMOL/L (ref 136–145)
WBC # BLD AUTO: 7.51 K/UL (ref 4.5–11)

## 2023-04-27 PROCEDURE — 25000003 PHARM REV CODE 250: Performed by: FAMILY MEDICINE

## 2023-04-27 PROCEDURE — 63600175 PHARM REV CODE 636 W HCPCS

## 2023-04-27 PROCEDURE — 25000003 PHARM REV CODE 250

## 2023-04-27 PROCEDURE — 87070 CULTURE OTHR SPECIMN AEROBIC: CPT | Performed by: SURGERY

## 2023-04-27 PROCEDURE — 63600175 PHARM REV CODE 636 W HCPCS: Performed by: NURSE ANESTHETIST, CERTIFIED REGISTERED

## 2023-04-27 PROCEDURE — 37000008 HC ANESTHESIA 1ST 15 MINUTES: Performed by: SURGERY

## 2023-04-27 PROCEDURE — 27000655: Performed by: ANESTHESIOLOGY

## 2023-04-27 PROCEDURE — 36000707: Performed by: SURGERY

## 2023-04-27 PROCEDURE — 25000003 PHARM REV CODE 250: Performed by: SURGERY

## 2023-04-27 PROCEDURE — D9220A PRA ANESTHESIA: Mod: CRNA,,, | Performed by: NURSE ANESTHETIST, CERTIFIED REGISTERED

## 2023-04-27 PROCEDURE — 63600175 PHARM REV CODE 636 W HCPCS: Performed by: ANESTHESIOLOGY

## 2023-04-27 PROCEDURE — 37000009 HC ANESTHESIA EA ADD 15 MINS: Performed by: SURGERY

## 2023-04-27 PROCEDURE — 99232 SBSQ HOSP IP/OBS MODERATE 35: CPT | Mod: GC,,, | Performed by: FAMILY MEDICINE

## 2023-04-27 PROCEDURE — 85025 COMPLETE CBC W/AUTO DIFF WBC: CPT

## 2023-04-27 PROCEDURE — 63600175 PHARM REV CODE 636 W HCPCS: Performed by: SURGERY

## 2023-04-27 PROCEDURE — 27000716 HC OXISENSOR PROBE, ANY SIZE: Performed by: ANESTHESIOLOGY

## 2023-04-27 PROCEDURE — 97165 OT EVAL LOW COMPLEX 30 MIN: CPT

## 2023-04-27 PROCEDURE — 83735 ASSAY OF MAGNESIUM: CPT

## 2023-04-27 PROCEDURE — 82962 GLUCOSE BLOOD TEST: CPT

## 2023-04-27 PROCEDURE — D9220A PRA ANESTHESIA: ICD-10-PCS | Mod: ANES,,, | Performed by: ANESTHESIOLOGY

## 2023-04-27 PROCEDURE — 87075 CULTR BACTERIA EXCEPT BLOOD: CPT | Performed by: SURGERY

## 2023-04-27 PROCEDURE — 87076 CULTURE ANAEROBE IDENT EACH: CPT | Performed by: SURGERY

## 2023-04-27 PROCEDURE — 71000033 HC RECOVERY, INTIAL HOUR: Performed by: SURGERY

## 2023-04-27 PROCEDURE — 36000706: Performed by: SURGERY

## 2023-04-27 PROCEDURE — 11044 DBRDMT BONE 1ST 20 SQ CM/<: CPT | Mod: ,,, | Performed by: SURGERY

## 2023-04-27 PROCEDURE — D9220A PRA ANESTHESIA: Mod: ANES,,, | Performed by: ANESTHESIOLOGY

## 2023-04-27 PROCEDURE — 97161 PT EVAL LOW COMPLEX 20 MIN: CPT

## 2023-04-27 PROCEDURE — 63600175 PHARM REV CODE 636 W HCPCS: Performed by: FAMILY MEDICINE

## 2023-04-27 PROCEDURE — 80053 COMPREHEN METABOLIC PANEL: CPT

## 2023-04-27 PROCEDURE — 11044 PR DEBRIDEMENT, SKIN, SUB-Q TISSUE,MUSCLE,BONE,=<20 SQ CM: ICD-10-PCS | Mod: ,,, | Performed by: SURGERY

## 2023-04-27 PROCEDURE — 11000001 HC ACUTE MED/SURG PRIVATE ROOM

## 2023-04-27 PROCEDURE — 27000510 HC BLANKET BAIR HUGGER ANY SIZE: Performed by: ANESTHESIOLOGY

## 2023-04-27 PROCEDURE — 27000177 HC AIRWAY, LARYNGEAL MASK: Performed by: ANESTHESIOLOGY

## 2023-04-27 PROCEDURE — D9220A PRA ANESTHESIA: ICD-10-PCS | Mod: CRNA,,, | Performed by: NURSE ANESTHETIST, CERTIFIED REGISTERED

## 2023-04-27 PROCEDURE — 25000003 PHARM REV CODE 250: Performed by: NURSE ANESTHETIST, CERTIFIED REGISTERED

## 2023-04-27 PROCEDURE — 99232 PR SUBSEQUENT HOSPITAL CARE,LEVL II: ICD-10-PCS | Mod: GC,,, | Performed by: FAMILY MEDICINE

## 2023-04-27 RX ORDER — MIDAZOLAM HYDROCHLORIDE 1 MG/ML
INJECTION INTRAMUSCULAR; INTRAVENOUS
Status: DISCONTINUED | OUTPATIENT
Start: 2023-04-27 | End: 2023-04-27

## 2023-04-27 RX ORDER — MORPHINE SULFATE 4 MG/ML
4 INJECTION, SOLUTION INTRAMUSCULAR; INTRAVENOUS EVERY 4 HOURS PRN
Status: DISCONTINUED | OUTPATIENT
Start: 2023-04-27 | End: 2023-05-02 | Stop reason: HOSPADM

## 2023-04-27 RX ORDER — PROPOFOL 10 MG/ML
VIAL (ML) INTRAVENOUS
Status: DISCONTINUED | OUTPATIENT
Start: 2023-04-27 | End: 2023-04-27

## 2023-04-27 RX ORDER — HYDROMORPHONE HYDROCHLORIDE 2 MG/ML
0.5 INJECTION, SOLUTION INTRAMUSCULAR; INTRAVENOUS; SUBCUTANEOUS EVERY 5 MIN PRN
Status: DISCONTINUED | OUTPATIENT
Start: 2023-04-27 | End: 2023-04-27 | Stop reason: HOSPADM

## 2023-04-27 RX ORDER — FENTANYL CITRATE 50 UG/ML
INJECTION, SOLUTION INTRAMUSCULAR; INTRAVENOUS
Status: DISCONTINUED | OUTPATIENT
Start: 2023-04-27 | End: 2023-04-27

## 2023-04-27 RX ORDER — ONDANSETRON 2 MG/ML
4 INJECTION INTRAMUSCULAR; INTRAVENOUS DAILY PRN
Status: DISCONTINUED | OUTPATIENT
Start: 2023-04-27 | End: 2023-04-27 | Stop reason: HOSPADM

## 2023-04-27 RX ORDER — DIPHENHYDRAMINE HYDROCHLORIDE 50 MG/ML
25 INJECTION INTRAMUSCULAR; INTRAVENOUS EVERY 6 HOURS PRN
Status: DISCONTINUED | OUTPATIENT
Start: 2023-04-27 | End: 2023-04-27 | Stop reason: HOSPADM

## 2023-04-27 RX ORDER — INSULIN ASPART 100 [IU]/ML
3 INJECTION, SOLUTION INTRAVENOUS; SUBCUTANEOUS
Status: DISCONTINUED | OUTPATIENT
Start: 2023-04-27 | End: 2023-04-29

## 2023-04-27 RX ORDER — MORPHINE SULFATE 10 MG/ML
4 INJECTION INTRAMUSCULAR; INTRAVENOUS; SUBCUTANEOUS EVERY 5 MIN PRN
Status: DISCONTINUED | OUTPATIENT
Start: 2023-04-27 | End: 2023-04-27 | Stop reason: HOSPADM

## 2023-04-27 RX ORDER — DEXAMETHASONE SODIUM PHOSPHATE 4 MG/ML
INJECTION, SOLUTION INTRA-ARTICULAR; INTRALESIONAL; INTRAMUSCULAR; INTRAVENOUS; SOFT TISSUE
Status: DISCONTINUED | OUTPATIENT
Start: 2023-04-27 | End: 2023-04-27

## 2023-04-27 RX ORDER — HYDROCODONE BITARTRATE AND ACETAMINOPHEN 7.5; 325 MG/1; MG/1
1 TABLET ORAL EVERY 6 HOURS PRN
Status: COMPLETED | OUTPATIENT
Start: 2023-04-27 | End: 2023-04-29

## 2023-04-27 RX ORDER — MEPERIDINE HYDROCHLORIDE 25 MG/ML
25 INJECTION INTRAMUSCULAR; INTRAVENOUS; SUBCUTANEOUS EVERY 10 MIN PRN
Status: DISCONTINUED | OUTPATIENT
Start: 2023-04-27 | End: 2023-04-27 | Stop reason: HOSPADM

## 2023-04-27 RX ORDER — LIDOCAINE HYDROCHLORIDE 20 MG/ML
INJECTION, SOLUTION EPIDURAL; INFILTRATION; INTRACAUDAL; PERINEURAL
Status: DISCONTINUED | OUTPATIENT
Start: 2023-04-27 | End: 2023-04-27

## 2023-04-27 RX ORDER — ONDANSETRON 2 MG/ML
INJECTION INTRAMUSCULAR; INTRAVENOUS
Status: DISCONTINUED | OUTPATIENT
Start: 2023-04-27 | End: 2023-04-27

## 2023-04-27 RX ADMIN — PIPERACILLIN AND TAZOBACTAM 4.5 G: 4; .5 INJECTION, POWDER, FOR SOLUTION INTRAVENOUS; PARENTERAL at 05:04

## 2023-04-27 RX ADMIN — PROPOFOL 200 MG: 10 INJECTION, EMULSION INTRAVENOUS at 07:04

## 2023-04-27 RX ADMIN — MORPHINE SULFATE 4 MG: 4 INJECTION, SOLUTION INTRAMUSCULAR; INTRAVENOUS at 01:04

## 2023-04-27 RX ADMIN — LIDOCAINE HYDROCHLORIDE 50 MG: 20 INJECTION, SOLUTION INTRAVENOUS at 07:04

## 2023-04-27 RX ADMIN — INSULIN ASPART 6 UNITS: 100 INJECTION, SOLUTION INTRAVENOUS; SUBCUTANEOUS at 07:04

## 2023-04-27 RX ADMIN — HUMAN INSULIN 8 UNITS: 100 INJECTION, SOLUTION SUBCUTANEOUS at 08:04

## 2023-04-27 RX ADMIN — SODIUM CHLORIDE: 9 INJECTION, SOLUTION INTRAVENOUS at 07:04

## 2023-04-27 RX ADMIN — ACETAMINOPHEN 325MG 650 MG: 325 TABLET ORAL at 12:04

## 2023-04-27 RX ADMIN — PIPERACILLIN AND TAZOBACTAM 4.5 G: 4; .5 INJECTION, POWDER, FOR SOLUTION INTRAVENOUS; PARENTERAL at 09:04

## 2023-04-27 RX ADMIN — PIPERACILLIN AND TAZOBACTAM 4.5 G: 4; .5 INJECTION, POWDER, FOR SOLUTION INTRAVENOUS; PARENTERAL at 12:04

## 2023-04-27 RX ADMIN — FENTANYL CITRATE 100 MCG: 50 INJECTION INTRAMUSCULAR; INTRAVENOUS at 07:04

## 2023-04-27 RX ADMIN — PRAVASTATIN SODIUM 20 MG: 10 TABLET ORAL at 09:04

## 2023-04-27 RX ADMIN — INSULIN ASPART 3 UNITS: 100 INJECTION, SOLUTION INTRAVENOUS; SUBCUTANEOUS at 11:04

## 2023-04-27 RX ADMIN — ONDANSETRON 4 MG: 2 INJECTION INTRAMUSCULAR; INTRAVENOUS at 07:04

## 2023-04-27 RX ADMIN — MIDAZOLAM 2 MG: 1 INJECTION INTRAMUSCULAR; INTRAVENOUS at 07:04

## 2023-04-27 RX ADMIN — VANCOMYCIN HYDROCHLORIDE 1500 MG: 500 INJECTION, POWDER, LYOPHILIZED, FOR SOLUTION INTRAVENOUS at 03:04

## 2023-04-27 RX ADMIN — INSULIN DETEMIR 15 UNITS: 100 INJECTION, SOLUTION SUBCUTANEOUS at 09:04

## 2023-04-27 RX ADMIN — INSULIN ASPART 3 UNITS: 100 INJECTION, SOLUTION INTRAVENOUS; SUBCUTANEOUS at 05:04

## 2023-04-27 RX ADMIN — INSULIN ASPART 6 UNITS: 100 INJECTION, SOLUTION INTRAVENOUS; SUBCUTANEOUS at 05:04

## 2023-04-27 RX ADMIN — ASPIRIN 81 MG: 81 TABLET, COATED ORAL at 09:04

## 2023-04-27 RX ADMIN — HYDROCHLOROTHIAZIDE 25 MG: 25 TABLET ORAL at 09:04

## 2023-04-27 RX ADMIN — CEFAZOLIN 2 G: 1 INJECTION, POWDER, FOR SOLUTION INTRAMUSCULAR; INTRAVENOUS; PARENTERAL at 07:04

## 2023-04-27 NOTE — TRANSFER OF CARE
"Anesthesia Transfer of Care Note    Patient: James Bolaños Jr.    Procedure(s) Performed: Procedure(s) (LRB):  DEBRIDEMENT, PHALANX, FOOT (Right)    Patient location: PACU    Anesthesia Type: general    Transport from OR: Transported from OR on 6-10 L/min O2 by face mask with adequate spontaneous ventilation    Post pain: adequate analgesia    Post assessment: no apparent anesthetic complications    Post vital signs: stable    Level of consciousness: responds to stimulation, awake and sedated    Nausea/Vomiting: no nausea/vomiting    Complications: none    Transfer of care protocol was followed      Last vitals:   Visit Vitals  /77 (BP Location: Right arm, Patient Position: Lying)   Pulse 88   Temp 36.8 °C (98.3 °F) (Oral)   Resp 10   Ht 5' 7" (1.702 m)   Wt 82.6 kg (182 lb)   SpO2 95%   BMI 28.51 kg/m²     "

## 2023-04-27 NOTE — OP NOTE
[facility]     Operative Note    SUMMARY     Date of Procedure: 4/27/2023     Procedure: Procedure(s) (LRB):  DEBRIDEMENT, PHALANX, FOOT (Right)     Surgeon(s) and Role:     * Fred Tan MD - Primary    Assisting Surgeon: None    Pre-Operative Diagnosis:  Osteomyelitis of right great toe    Post-Operative Diagnosis:  Osteomyelitis of right great toe    Anesthesia: General/MAC    Technical Procedures Used:  The patient was brought to the operating room and placed in supine position.  IV sedation was provided per anesthesia staff.  The right foot was prepped and draped in standard fashion.  Purulent fluid was then manually expressed from the wound that existed on the medial aspect of the right great toe.  Cultures were obtained and sent for permanent microbiology.  Subsequent to this, the scissors were used to debride skin and subcutaneous tissue down to the IP joint.  Rongeur pliers were then used to debride the distal aspect of the proximal phalanx and the proximal aspect of the distal phalanx.  Specimen was obtained from both debridements and sent to micro for culture.  Wound was evaluated for hemostasis and irrigated with a copious amount of saline solution.  Once satisfied with hemostasis, the wound was further irrigated and packed open with iodoform gauze.      Description of the Findings of the Procedure:  Purulent fluid was cultured. The proximal aspect of the distal phalanx was very soft during bony debridement.  This was cultured.  The distal aspect of the proximal phalanx was also debrided and cultured.     Assistant(s): LILLIE Cain    Complications: No    Estimated Blood Loss (EBL): 2 mL           Implants: * No implants in log *    Specimens:  cultures of pus; bone cultures prox phalanx right great toe; bone cultures distal phalanx right great toe  Specimen (24h ago, onward)      None             Condition: Good      Complications:  None

## 2023-04-27 NOTE — ANESTHESIA POSTPROCEDURE EVALUATION
Anesthesia Post Evaluation    Patient: James Bolaños Jr.    Procedure(s) Performed: Procedure(s) (LRB):  DEBRIDEMENT, PHALANX, FOOT (Right)    Final Anesthesia Type: general      Patient location during evaluation: PACU  Post-procedure vital signs: reviewed and stable  Pain management: adequate  Airway patency: patent    PONV status at discharge: No PONV  Anesthetic complications: no      Cardiovascular status: hemodynamically stable  Respiratory status: unassisted  Hydration status: euvolemic  Follow-up not needed.          Vitals Value Taken Time   /76 04/27/23 1255   Temp 36.8 °C (98.3 °F) 04/27/23 0826   Pulse 85 04/27/23 1255   Resp 18 04/27/23 1326   SpO2 96 % 04/27/23 1255         Event Time   Out of Recovery 04/27/2023 08:50:00         Pain/Eric Score: Pain Rating Prior to Med Admin: 7 (4/27/2023  1:26 PM)  Pain Rating Post Med Admin: 2 (4/27/2023  1:56 PM)  Eric Score: 10 (4/27/2023  8:48 AM)

## 2023-04-27 NOTE — HOSPITAL COURSE
4/27/23: No overnight events. Pt underwent surgical debridement of R toe this AM with Dr. Tan. Wound and bone cultures pending.  Continue IV antibiotics. Blood cultures pending. Wound care on board for daily care. 4/28/23: No acute overnight events. Patient did well with PT/OT. Pt has urinated since surgery but denies having a BM yet. Wound and bone cultures showed heavy growth Streptococcus agalactiae (Group B). Blood cultures negative at 24 hours. Continue IV antibiotics (Day 3: 4/26 - ). Wound care on board. 4/29/23: No overnight events. Wound and bone culture growing GBS. Will change vancomycin to Rocephin. Will get home health to [facility] with set up for outpatient IV treatment. Ordered a PICC line and get home health to also do wound care when patient is discharged.  4/30/23: No overnight events. Pt underwent debridement of the R great toe on 4/27. Pt reports no complaints. He denies pain as well as fever, chills, palpitations, diaphoresis, abd pain and n/v/d. BG remains somewhat uncontrolled in the mid 200s. His prandial insulin was increased by 2 units at each dose. Will monitor glucose and potentially d/c him on long acting insulin BID. 5/1/23: No overnight events. Denies fever, chills, chest pain, SOB, swelling. He does have some throbbing pain in foot but is controlled with pain medication. Continue IV Rocephin. Patient to get PICC line, continue abx for 6 weeks (Day 6: 4/26-6/7/23). SW on board and will help determine outpt vs LTAC/swingbed. Continue daily wound care. POC Glucose today 186. Continue insulin and will monitor. Changed antibiotics from IV Ceftriaxone to IV Ampicillin (wound culture grew Enterococcus, and Strep is also susceptible to ampicillin). IV Ampicillin 2g every 4 hours in the hospital.Patient will be D/C with IV Daptomycin 6mg/kg QD via PICC line with . Social service consult placed. Patient will need weekly CBC, BMP, and CPK level. D/C Statin at discharge. Patient has reached maximum  benefit from this admission. Patient will continue his antibiotic with home health. Patient is to follow up with his PCP in 1 week for hospitial follow up. Patient is to follow up with surgery in two weeks for wound assessment. Patient verbalized understanding. Patient told if symtoms worsen to return to the emergency department. Patient verbalized understanding.

## 2023-04-27 NOTE — PT/OT/SLP EVAL
Occupational Therapy   Evaluation and Discharge Note    Name: James Bolaños Jr.  MRN: [id number]  Admitting Diagnosis: Diabetic foot ulcer associated with type 2 diabetes mellitus  Recent Surgery: Procedure(s) (LRB):  DEBRIDEMENT, PHALANX, FOOT (Right) Day of Surgery    Recommendations:     Discharge Recommendations: home  Discharge Equipment Recommendations: other (see comments) (to be determined)  Barriers to discharge:  Other (Comment) (ongoing medical treatment)    Assessment:     James Bolaños Jr. is a 52 y.o. male with a medical diagnosis of Diabetic foot ulcer associated with type 2 diabetes mellitus. At this time, patient is functioning at their prior level of function and does not require further acute OT services.     Plan:     During this hospitalization, patient does not require further acute OT services.  Please re-consult if situation changes.    Plan of Care Reviewed with: patient    Subjective     Chief Complaint: s/p right great toe debridement  Patient/Family Comments/goals: pt agreeable to OT eval    Occupational Profile:  Living Environment: pt lives with significant other in one story home with no steps to enter  Previous level of function: independent with all ADL tasks, IADL tasks, functional mobility, and working  Roles and Routines: pt performs maintenance work for the [employer]  Equipment Used at home: none  Assistance upon Discharge: home with family assist as needed    Pain/Comfort:  Pain Rating 1: 4/10  Location - Side 1: Right  Location 1: foot    Patients cultural, spiritual, Adventism conflicts given the current situation: no    Objective:     Communicated with: BAMBI Guidry prior to session.  Patient found supine with blood pressure cuff, peripheral IV, pulse ox (continuous), telemetry upon OT entry to room.    General Precautions: Standard, fall  Orthopedic Precautions:  (RLE weight bearing through heel)  Braces: N/A  Respiratory Status: Room air     Occupational  Performance:    Bed Mobility:    Patient completed Supine to Sit with independence  Patient completed Sit to Supine with independence    Functional Mobility/Transfers:  Patient completed Sit <> Stand Transfer with independence  with  rolling walker   Functional Mobility: pt performed functional mobility in room with SBA with RW    Activities of Daily Living:  Lower Body Dressing: independence to dewayne socks    Cognitive/Visual Perceptual:  Cognitive/Psychosocial Skills:     -       Oriented to: Person, Place, Time, and Situation   -       Follows Commands/attention:Follows multistep  commands  -       Mood/Affect/Coping skills/emotional control: Cooperative    Physical Exam:  Balance:    -       WFL  Upper Extremity Range of Motion:     -       Right Upper Extremity: WFL  -       Left Upper Extremity: WFL  Upper Extremity Strength:    -       Right Upper Extremity: WFL  -       Left Upper Extremity: WFL  Gross motor coordination:   WFL    AMPAC 6 Click ADL:  AMPAC Total Score: 24    Treatment & Education:  Pt educated on OT POC.     Patient left HOB elevated with all lines intact and call button in reach    GOALS:   Multidisciplinary Problems       Occupational Therapy Goals       Not on file                    History:     Past Medical History:   Diagnosis Date    Diabetes mellitus     Hypertension          Past Surgical History:   Procedure Laterality Date    CHOLECYSTECTOMY      DEBRIDEMENT OF FOOT      DEBRIDEMENT OF LOWER EXTREMITY Right 10/23/2021    Procedure: DEBRIDEMENT, LOWER EXTREMITY;  Surgeon: Fred Tan MD;  Location: [facility];  Service: General;  Laterality: Right;  right foot    JOINT REPLACEMENT      TOE AMPUTATION Right 10/23/2021    Procedure: AMPUTATION, TOE;  Surgeon: Fred Tan MD;  Location: [facility];  Service: General;  Laterality: Right;  second toe       Time Tracking:     OT Date of Treatment: 04/27/23  OT Start Time: 0948  OT Stop Time: 0958  OT Total Time (min): 10  min    Billable Minutes:Evaluation OT min complexity eval    4/27/2023

## 2023-04-27 NOTE — ANESTHESIA PREPROCEDURE EVALUATION
04/27/2023  James Bolaños Jr. is a 52 y.o., male.      Pre-op Assessment    I have reviewed the Patient Summary Reports.    I have reviewed the NPO Status.   I have reviewed the Medications.     Review of Systems  Anesthesia Hx:  Denies Family Hx of Anesthesia complications.   Denies Personal Hx of Anesthesia complications.   Social:  Non-Smoker, No Alcohol Use    Hematology/Oncology:  Hematology Normal   Oncology Normal     EENT/Dental:EENT/Dental Normal   Cardiovascular:   Hypertension hyperlipidemia    Pulmonary:  Pulmonary Normal    Renal/:  Renal/ Normal     Hepatic/GI:  Hepatic/GI Normal    Musculoskeletal:  Musculoskeletal Normal Diabetic foot ulcer associated with type 2 diabetes mellitus   Neurological:  Neurology Normal    Endocrine:   Diabetes    Dermatological:  Skin Normal    Psych:  Psychiatric Normal           Physical Exam  General: Well nourished, Cooperative, Alert and Oriented    Airway:  Mallampati: II / II  Mouth Opening: Normal  TM Distance: Normal  Neck ROM: Normal ROM    Dental:  Intact    Chest/Lungs:  Clear to auscultation    Heart:  Rate: Normal  Rhythm: Regular Rhythm  Sounds: Normal        Chemistry        Component Value Date/Time     04/27/2023 0313    K 3.5 04/27/2023 0313    CL 99 04/27/2023 0313    CO2 30 04/27/2023 0313    BUN 7 04/27/2023 0313    CREATININE 0.88 04/27/2023 0313     (H) 04/27/2023 0313        Component Value Date/Time    CALCIUM 9.5 04/27/2023 0313    ALKPHOS 106 04/27/2023 0313    AST 14 (L) 04/27/2023 0313    ALT 15 (L) 04/27/2023 0313    BILITOT 0.4 04/27/2023 0313    EGFRNONAA 58 (L) 10/27/2021 0928        Lab Results   Component Value Date    WBC 7.51 04/27/2023    HGB 13.2 (L) 04/27/2023    HCT 38.3 (L) 04/27/2023     04/27/2023     Results for orders placed or performed during the hospital encounter of 10/22/21   EKG 12-lead     Collection Time: 10/22/21 10:43 PM    Narrative    Test Reason : Z01.818,    Vent. Rate : 082 BPM     Atrial Rate : 082 BPM     P-R Int : 158 ms          QRS Dur : 092 ms      QT Int : 368 ms       P-R-T Axes : 047 -02 019 degrees     QTc Int : 429 ms    Normal sinus rhythm  Normal ECG  No previous ECGs available  Confirmed by Uziel Salinas DO ([id number]) on 10/24/2021 11:43:12 AM    Referred By: MANUEL MCINTOSH           Confirmed By:Uziel Salinas DO         Anesthesia Plan  Type of Anesthesia, risks & benefits discussed:    Anesthesia Type: Gen Supraglottic Airway  Intra-op Monitoring Plan: Standard ASA Monitors  Post Op Pain Control Plan: multimodal analgesia  Induction:  IV  Airway Plan: Direct  Informed Consent: Informed consent signed with the Patient and all parties understand the risks and agree with anesthesia plan.  All questions answered.   ASA Score: 3  Day of Surgery Review of History & Physical: H&P Update referred to the surgeon/provider.I have interviewed and examined the patient. I have reviewed the patient's H&P dated: There are no significant changes.     Ready For Surgery From Anesthesia Perspective.     .

## 2023-04-27 NOTE — PLAN OF CARE
[facility]  Initial Discharge Assessment       Primary Care Provider: [facility]    Admission Diagnosis: Gangrene of toe [I96]  Noncompliance with medication regimen [Z91.148]  Gangrene of right foot [I96]    Admission Date: 4/26/2023  Expected Discharge Date:     Discharge Barriers Identified: None    Payor: BLUE CROSS BLUE SHIELD / Plan: BCBS FEDERAL BASIC / Product Type: PPO /     Extended Emergency Contact Information  Primary Emergency Contact: Yara Peña  Home Phone: [phone]  Relation: Friend  Preferred language: English   needed? No  Secondary Emergency Contact: Chad Bolaños  Home Phone: [phone]  Relation: Daughter  Preferred language: English   needed? No    Discharge Plan A: Home with family  Discharge Plan B: Home with family      [facility] [place] - [address] [address]  Phone: [phone] Fax: [phone]      Initial Assessment (most recent)       Adult Discharge Assessment - 04/27/23 1248          Discharge Assessment    Assessment Type Discharge Planning Assessment     Source of Information patient     People in Home spouse     Do you expect to return to your current living situation? Yes     Do you have help at home or someone to help you manage your care at home? Yes     Who are your caregiver(s) and their phone number(s)? Chad Bolaños- Daughter [phone]     Prior to hospitilization cognitive status: Alert/Oriented     Current cognitive status: Alert/Oriented     Equipment Currently Used at Home none     Readmission within 30 days? No     Patient currently being followed by outpatient case management? No     Do you currently have service(s) that help you manage your care at home? No     Do you take prescription medications? Yes     Do you have prescription coverage? Yes     Coverage BCBS of [place]     Do you have any problems affording any of your prescribed  medications? No     Is the patient taking medications as prescribed? yes     Who is going to help you get home at discharge? Daughter     How do you get to doctors appointments? car, drives self;family or friend will provide     Are you on dialysis? No     Do you take coumadin? No     Discharge Plan A Home with family     Discharge Plan B Home with family     DME Needed Upon Discharge  none     Discharge Plan discussed with: Patient     Discharge Barriers Identified None        Physical Activity    On average, how many days per week do you engage in moderate to strenuous exercise (like a brisk walk)? 7 days     On average, how many minutes do you engage in exercise at this level? 40 min        Financial Resource Strain    How hard is it for you to pay for the very basics like food, housing, medical care, and heating? Not hard at all        Housing Stability    In the last 12 months, was there a time when you were not able to pay the mortgage or rent on time? No     In the last 12 months, was there a time when you did not have a steady place to sleep or slept in a shelter (including now)? No        Transportation Needs    In the past 12 months, has lack of transportation kept you from medical appointments or from getting medications? No     In the past 12 months, has lack of transportation kept you from meetings, work, or from getting things needed for daily living? No        Food Insecurity    Within the past 12 months, you worried that your food would run out before you got the money to buy more. Never true     Within the past 12 months, the food you bought just didn't last and you didn't have money to get more. Never true        Stress    Do you feel stress - tense, restless, nervous, or anxious, or unable to sleep at night because your mind is troubled all the time - these days? Not at all        Social Connections    In a typical week, how many times do you talk on the phone with family, friends, or neighbors?  More than three times a week     How often do you get together with friends or relatives? More than three times a week     How often do you attend Lutheran or Methodist services? Never     Do you belong to any clubs or organizations such as Lutheran groups, unions, fraternal or athletic groups, or school groups? No     How often do you attend meetings of the clubs or organizations you belong to? Never     Are you , , , , never , or living with a partner?         Alcohol Use    Q1: How often do you have a drink containing alcohol? 2-4 times a month     Q2: How many drinks containing alcohol do you have on a typical day when you are drinking? 1 or 2     Q3: How often do you have six or more drinks on one occasion? Never                   Pt lives at home with spouse, not current with hh and uses no dme. Pt plans to dc back home when medically ready for dc. SW entered pt into BCBS portal and SDOH questions completed. SW will cont to follow for dc needs.

## 2023-04-27 NOTE — PROGRESS NOTES
"[facility]  [facility] Medicine  Progress Note    Patient Name: James Bolaños Jr.  MRN: [id number]  Patient Class: IP- Inpatient   Admission Date: 4/26/2023  Length of Stay: 1 days  Attending Physician: Edith Gatica DO  Primary Care Provider: [facility]        Subjective:     Principal Problem:Diabetic foot ulcer associated with type 2 diabetes mellitus        HPI:  Patient is a 51 yo M with a PMH of HTN and T2DM presenting to [facility] via EMS from [facility] for right big toe ulcer and swelling. Pt reports that the swelling and ulcer have been bothering him for about 1 week. He states he thought it was just a blister from wearing his boots, but he took his boot off a few days ago and states his foot "didn't feel right". Pt denies any pain but does state it throbs. Pt also reports redness to his big toe and foot. Pt reports polyuria, but denies fever, chills, chest pain, SOB, abdominal pain.N/V, constipation, diarrhea, dysuria.    The patient had the second toe on his right foot amputated in October 2021 with Dr. Tan. Pt is supposed to take aspirin, pravastatin, metformin, and insulin but he admits he has not being consistently compliant with his medications. Pt also notes he does not check his blood sugars at home.    In the ED, initial presenting vitals were /86, HR 86, RR 16, T 98 °F, Sp)2 98% on RA. Labs demonstrated: WBC 7.9, Hgb 12.7, Hct 37.7, Plts 383, Na 135, K 3.6, BUN 10, Cr 0.81, Glucose 249, , Alb 2.9, Lactic Acid 0.8, AST 8, ALT 15. The patient was given IV NS 1L bolus x1, Zosyn 4.5g IV x1 and Vancomycin 1,500 mg IV x1.     The patient was admitted to the [facility] service under the direct supervision of Dr. En DO for the continued care and medical management of this patient.      Overview/Hospital Course:  4/27/23: No overnight events. Pt underwent surgical debridement of R toe this AM with Dr. Tan. Wound and " bone cultures pending.  Continue IV antibiotics. Blood cultures pending. Wound care on board for daily care.       Interval History: Pt underwent surgical debridement of R toe this AM with Dr. Tan. Wound and bone cultures pending. Pt sitting comfortably in bed during AM rounds. No overnight events. Continue IV antibiotics. Blood cultures pending. Wound care on board for daily care.    Review of Systems   Constitutional:  Negative for fatigue and fever.   HENT:  Negative for ear discharge, ear pain, facial swelling, sinus pressure, sinus pain and sneezing.    Eyes:  Negative for pain, discharge and itching.   Respiratory:  Negative for cough and shortness of breath.    Cardiovascular: Negative.    Gastrointestinal:  Negative for abdominal distention, abdominal pain, anal bleeding, constipation, diarrhea and nausea.   Endocrine: Negative.    Genitourinary:  Negative for difficulty urinating, flank pain and frequency.   Musculoskeletal:  Positive for arthralgias.   Skin:  Positive for wound.   Allergic/Immunologic: Negative.    Neurological:  Negative for light-headedness and headaches.   Psychiatric/Behavioral: Negative.     Objective:     Vital Signs (Most Recent):  Temp: 98.3 °F (36.8 °C) (04/27/23 0826)  Pulse: 85 (04/27/23 0850)  Resp: 13 (04/27/23 0850)  BP: 111/81 (04/27/23 0942)  SpO2: (!) 94 % (04/27/23 0850)   Vital Signs (24h Range):  Temp:  [98 °F (36.7 °C)-99.2 °F (37.3 °C)] 98.3 °F (36.8 °C)  Pulse:  [76-98] 85  Resp:  [10-20] 13  SpO2:  [94 %-98 %] 94 %  BP: ()/(59-88) 111/81     Weight: 82.6 kg (182 lb)  Body mass index is 28.51 kg/m².    Intake/Output Summary (Last 24 hours) at 4/27/2023 1127  Last data filed at 4/27/2023 0806  Gross per 24 hour   Intake 650 ml   Output 2302 ml   Net -1652 ml      Physical Exam  Constitutional:       Appearance: Normal appearance. He is normal weight.   HENT:      Head: Normocephalic and atraumatic.      Right Ear: Tympanic membrane normal.      Left Ear:  Tympanic membrane normal.      Nose: Nose normal.      Mouth/Throat:      Mouth: Mucous membranes are moist.      Pharynx: Oropharynx is clear.   Eyes:      Conjunctiva/sclera: Conjunctivae normal.      Pupils: Pupils are equal, round, and reactive to light.   Cardiovascular:      Rate and Rhythm: Normal rate and regular rhythm.      Pulses: Normal pulses.      Heart sounds: Normal heart sounds.   Pulmonary:      Effort: Pulmonary effort is normal.      Breath sounds: Normal breath sounds.   Abdominal:      General: Abdomen is flat. Bowel sounds are normal.   Musculoskeletal:         General: Deformity present. Normal range of motion.      Cervical back: Normal range of motion.      Comments: Right great toe with dressing in place s/p debridement this AM  Second toe on R amputated   Skin:     General: Skin is warm and dry.      Capillary Refill: Capillary refill takes less than 2 seconds.   Neurological:      Mental Status: He is alert and oriented to person, place, and time.   Psychiatric:         Mood and Affect: Mood normal.       Significant Labs: All pertinent labs within the past 24 hours have been reviewed.    Significant Imaging: I have reviewed all pertinent imaging results/findings within the past 24 hours.      Assessment/Plan:      * Diabetic foot ulcer associated with type 2 diabetes mellitus  Patient's FSGs are uncontrolled due to hyperglycemia on current medication regimen.  Last A1c reviewed-   Lab Results   Component Value Date    HGBA1C 11.7 (H) 04/26/2023     Most recent fingerstick glucose reviewed- No results for input(s): POCTGLUCOSE in the last 24 hours.  Current correctional scale  Medium  Maintain anti-hyperglycemic dose as follows-   Antihyperglycemics (From admission, onward)    Start     Stop Route Frequency Ordered    04/27/23 2100  insulin detemir U-100 injection 15 Units         -- SubQ Nightly 04/27/23 0723    04/27/23 1130  insulin aspart U-100 injection 3 Units         -- SubQ 3  times daily with meals 04/27/23 0724    04/26/23 1455  insulin aspart U-100 injection 1-10 Units         -- SubQ Every 6 hours PRN 04/26/23 1355        Hold Oral hypoglycemics while patient is in the hospital.    Patient has not been complaint with medication and diet  Surgery Consulted thank them for their assistance  Wound Care consulted thank them for their assistance  Wound Culture and Blood cultures pending  ESR, CRP pending  Xray of Right foot pending  Vancomycin pharmacy to Dose  Zosyn 4.45 Q8hr will monitor cultures    4/27/23: surgical debridement of ulcer done today      Hypertension  Patient has not taking any medication in months  Start HCTZ 25 mg daily  Hydralazine 10 mg IVP PRN        Hyperlipidemia  Pravastatin 20 mg  Aspirin 81 mg        VTE Risk Mitigation (From admission, onward)         Ordered     IP VTE LOW RISK PATIENT  Once         04/26/23 1346     Place sequential compression device  Until discontinued         04/26/23 1346                Discharge Planning   MÓNICA:      Code Status: Full Code   Is the patient medically ready for discharge?:     Reason for patient still in hospital (select all that apply): Treatment                     Aaron Magaña MD  Department of Hospital Medicine   [facility]

## 2023-04-27 NOTE — INTERVAL H&P NOTE
The patient has been examined and the H&P has been reviewed:    I concur with the findings and changes have been noted since the H&P was written: patient would like to have bony debrideement, only at this point, with IV abx treatment of residual osteo.  He does not wish toe amputation, at this point.     Surgery risks, benefits and alternative options discussed and understood by patient/family.

## 2023-04-27 NOTE — ASSESSMENT & PLAN NOTE
Patient's FSGs are uncontrolled due to hyperglycemia on current medication regimen.  Last A1c reviewed-   Lab Results   Component Value Date    HGBA1C 11.7 (H) 04/26/2023     Most recent fingerstick glucose reviewed- No results for input(s): POCTGLUCOSE in the last 24 hours.  Current correctional scale  Medium  Maintain anti-hyperglycemic dose as follows-   Antihyperglycemics (From admission, onward)    Start     Stop Route Frequency Ordered    04/27/23 2100  insulin detemir U-100 injection 15 Units         -- SubQ Nightly 04/27/23 0723    04/27/23 1130  insulin aspart U-100 injection 3 Units         -- SubQ 3 times daily with meals 04/27/23 0724    04/26/23 1455  insulin aspart U-100 injection 1-10 Units         -- SubQ Every 6 hours PRN 04/26/23 1355        Hold Oral hypoglycemics while patient is in the hospital.    Patient has not been complaint with medication and diet  Surgery Consulted thank them for their assistance  Wound Care consulted thank them for their assistance  Wound Culture and Blood cultures pending  ESR, CRP pending  Xray of Right foot pending  Vancomycin pharmacy to Dose  Zosyn 4.45 Q8hr will monitor cultures    4/27/23: surgical debridement of ulcer done today

## 2023-04-27 NOTE — OR NURSING
0818 PT TO PACU DROWSY, AROUSABLE TO VOICE. ORAL AIRWAY REMOVED UPON ARRIVAL. O2 AT 6L. IV INFUSING. DRESSING TO R FOOT C/D/I. SCANT BLOOD NOTED. VSS. SAFETY MEASURES IN PLACE. DR. HAAS AT BEDSIDE, NEW ORDER FOR 8U IV INSULIN.    0822 INSULIN GIVEN. SEE MAR. PT AWAKE AND ALERT. O2 REMOVED. PT DENIES PAIN AT THIS TIME.     0833 PT AWAKE AND ALERT. VSS. PT DENIES PAIN. AT THIS TIME. DRESSING TO R FOOT C/D/I. SCANT BLOOD NOTED.    0848 PT AWAKE AND ALERT, DENIES PAIN. VSS. DRESSING TO R FOOT C/D/I. SMALL AMOUNT OF BLOOD NOTED. BS IMPROVED . DR. HAAS INFORMED.    0850 OUT OF PACU    0855 PT TRANSFERRED BACK TO ROOM 469. RELEASED TO BAMBI SCHMITZ. PT AWAKE AND ALERT. ABLE TO MOVE SELF FROM STRETCHER TO BED. RESP EVEN AND UNLABORED. IV INFUSING. DRESSING TO R FOOT C/D/I. SMALL AMOUNT OF BLOOD NOTED. VS: /71, HR 89, RR 12, TEMP 97.6, SPO2 95% ON RA. SAFETY MEASURES IN PLACE. FAMILY AT BEDSIDE.

## 2023-04-27 NOTE — SUBJECTIVE & OBJECTIVE
Interval History: Pt underwent surgical debridement of R toe this AM with Dr. Tan. Wound and bone cultures pending. Pt sitting comfortably in bed during AM rounds. No overnight events. Continue IV antibiotics. Blood cultures pending. Wound care on board for daily care.    Review of Systems   Constitutional:  Negative for fatigue and fever.   HENT:  Negative for ear discharge, ear pain, facial swelling, sinus pressure, sinus pain and sneezing.    Eyes:  Negative for pain, discharge and itching.   Respiratory:  Negative for cough and shortness of breath.    Cardiovascular: Negative.    Gastrointestinal:  Negative for abdominal distention, abdominal pain, anal bleeding, constipation, diarrhea and nausea.   Endocrine: Negative.    Genitourinary:  Negative for difficulty urinating, flank pain and frequency.   Musculoskeletal:  Positive for arthralgias.   Skin:  Positive for wound.   Allergic/Immunologic: Negative.    Neurological:  Negative for light-headedness and headaches.   Psychiatric/Behavioral: Negative.     Objective:     Vital Signs (Most Recent):  Temp: 98.3 °F (36.8 °C) (04/27/23 0826)  Pulse: 85 (04/27/23 0850)  Resp: 13 (04/27/23 0850)  BP: 111/81 (04/27/23 0942)  SpO2: (!) 94 % (04/27/23 0850)   Vital Signs (24h Range):  Temp:  [98 °F (36.7 °C)-99.2 °F (37.3 °C)] 98.3 °F (36.8 °C)  Pulse:  [76-98] 85  Resp:  [10-20] 13  SpO2:  [94 %-98 %] 94 %  BP: ()/(59-88) 111/81     Weight: 82.6 kg (182 lb)  Body mass index is 28.51 kg/m².    Intake/Output Summary (Last 24 hours) at 4/27/2023 1127  Last data filed at 4/27/2023 0806  Gross per 24 hour   Intake 650 ml   Output 2302 ml   Net -1652 ml      Physical Exam  Constitutional:       Appearance: Normal appearance. He is normal weight.   HENT:      Head: Normocephalic and atraumatic.      Right Ear: Tympanic membrane normal.      Left Ear: Tympanic membrane normal.      Nose: Nose normal.      Mouth/Throat:      Mouth: Mucous membranes are moist.       Pharynx: Oropharynx is clear.   Eyes:      Conjunctiva/sclera: Conjunctivae normal.      Pupils: Pupils are equal, round, and reactive to light.   Cardiovascular:      Rate and Rhythm: Normal rate and regular rhythm.      Pulses: Normal pulses.      Heart sounds: Normal heart sounds.   Pulmonary:      Effort: Pulmonary effort is normal.      Breath sounds: Normal breath sounds.   Abdominal:      General: Abdomen is flat. Bowel sounds are normal.   Musculoskeletal:         General: Deformity present. Normal range of motion.      Cervical back: Normal range of motion.      Comments: Right great toe with dressing in place s/p debridement this AM  Second toe on R amputated   Skin:     General: Skin is warm and dry.      Capillary Refill: Capillary refill takes less than 2 seconds.   Neurological:      Mental Status: He is alert and oriented to person, place, and time.   Psychiatric:         Mood and Affect: Mood normal.       Significant Labs: All pertinent labs within the past 24 hours have been reviewed.    Significant Imaging: I have reviewed all pertinent imaging results/findings within the past 24 hours.

## 2023-04-27 NOTE — PT/OT/SLP EVAL
Physical Therapy Evaluation    Patient Name:  James Bolaños Jr.   MRN:  [id number]    Recommendations:     Discharge Recommendations: home   Discharge Equipment Recommendations:  (knee scooter, post op shoe)   Barriers to discharge: None    Assessment:     James Bolaños Jr. is a 52 y.o. male admitted with a medical diagnosis of Diabetic foot ulcer associated with type 2 diabetes mellitus.  He presents with the following impairments/functional limitations:   Patient request knee scooter for home. Needs post op shoe. Social working put in for both. Okay for home from PT standpoitn.    Rehab Prognosis: Good; patient would benefit from acute skilled PT services to address these deficits and reach maximum level of function.    Recent Surgery: Procedure(s) (LRB):  DEBRIDEMENT, PHALANX, FOOT (Right) Day of Surgery    Plan:     During this hospitalization, patient to be seen 1 x/week to address the identified rehab impairments via gait training and progress toward the following goals:    Plan of Care Expires:  04/27/23    Subjective     Chief Complaint: post op pain  Patient/Family Comments/goals: Patient denies need for rolling walker or crutches. Wants a knee scooter and post op shoe  Pain/Comfort:  Pain Rating 1: 4/10  Location - Side 1: Right  Location 1: foot  Pain Addressed 1: Cessation of Activity    Patients cultural, spiritual, Hindu conflicts given the current situation: no    Living Environment:  Lives with family  Prior to admission, patients level of function was independent.  Equipment used at home: none.  DME owned (not currently used): none.  Upon discharge, patient will have assistance from family.    Objective:     Communicated with nurse prior to session.  Patient found supine with    upon PT entry to room.    General Precautions: Standard, fall  Orthopedic Precautions:RLE partial weight bearing   Braces:    Respiratory Status: Room air    Exams:  Dressing to right foot    Functional Mobility:  Gait:  'ambulated 50 feet with rolling walker heel down weight bearing      AM-PAC 6 CLICK MOBILITY  Total Score:24       Treatment & Education:  Heel down wb    Patient left supine with call button in reach.    GOALS:   Multidisciplinary Problems       Physical Therapy Goals       Not on file                    History:     Past Medical History:   Diagnosis Date    Diabetes mellitus     Hypertension        Past Surgical History:   Procedure Laterality Date    CHOLECYSTECTOMY      DEBRIDEMENT OF FOOT      DEBRIDEMENT OF LOWER EXTREMITY Right 10/23/2021    Procedure: DEBRIDEMENT, LOWER EXTREMITY;  Surgeon: Fred Tan MD;  Location: [facility];  Service: General;  Laterality: Right;  right foot    JOINT REPLACEMENT      TOE AMPUTATION Right 10/23/2021    Procedure: AMPUTATION, TOE;  Surgeon: Fred Tan MD;  Location: [facility];  Service: General;  Laterality: Right;  second toe       Time Tracking:     PT Received On: 04/27/23  PT Start Time: 1330     PT Stop Time: 1350  PT Total Time (min): 20 min     Billable Minutes: Evaluation 20 04/27/2023

## 2023-04-27 NOTE — PLAN OF CARE
SW consulted for knee scooter and post op shoe. SW faxed referral to [facility]. SW following.

## 2023-04-27 NOTE — PLAN OF CARE
Problem: Adult Inpatient Plan of Care  Goal: Plan of Care Review  Outcome: Ongoing, Progressing  Goal: Patient-Specific Goal (Individualized)  4/26/2023 1930 by Alexis Gavin RN  Outcome: Ongoing, Progressing  4/26/2023 1929 by Alexis Gavin RN  Outcome: Ongoing, Progressing  Goal: Absence of Hospital-Acquired Illness or Injury  4/26/2023 1930 by Alexis Gavin RN  Outcome: Ongoing, Progressing  4/26/2023 1929 by Alexis Gavin RN  Outcome: Ongoing, Progressing  Goal: Optimal Comfort and Wellbeing  4/26/2023 1930 by Alexis Gavin RN  Outcome: Ongoing, Progressing  4/26/2023 1929 by Alexis Gavin RN  Outcome: Ongoing, Progressing  Goal: Readiness for Transition of Care  4/26/2023 1930 by Alexis Gavin RN  Outcome: Ongoing, Progressing  4/26/2023 1929 by Alexis Gavin RN  Outcome: Ongoing, Progressing     Problem: Diabetes Comorbidity  Goal: Blood Glucose Level Within Targeted Range  4/26/2023 1930 by Alexis Gavin RN  Outcome: Ongoing, Progressing  4/26/2023 1929 by Alexis Gavin RN  Outcome: Ongoing, Progressing     Problem: Infection  Goal: Absence of Infection Signs and Symptoms  Outcome: Ongoing, Progressing

## 2023-04-27 NOTE — ANESTHESIA PROCEDURE NOTES
Intubation    Date/Time: 4/27/2023 7:39 AM  Performed by: Patric Black CRNA  Authorized by: Hilario Buchanan MD     Intubation:     Induction:  Intravenous    Intubated:  Postinduction    Mask Ventilation:  Easy mask    Attempts:  1    Attempted By:  CRNA    Method of Intubation:  Direct    Difficult Airway Encountered?: No      Complications:  None    Airway Device:  Supraglottic airway/LMA    Airway Device Size:  4.0    Style/Cuff Inflation:  Cuffed (inflated to minimal occlusive pressure)    Placement Verified By:  Capnometry    Complicating Factors:  None    Findings Post-Intubation:  BS equal bilateral and atraumatic/condition of teeth unchanged

## 2023-04-28 LAB
ANION GAP SERPL CALCULATED.3IONS-SCNC: 14 MMOL/L (ref 7–16)
BASOPHILS # BLD AUTO: 0.08 K/UL (ref 0–0.2)
BASOPHILS NFR BLD AUTO: 1.3 % (ref 0–1)
BUN SERPL-MCNC: 10 MG/DL (ref 7–18)
BUN/CREAT SERPL: 12 (ref 6–20)
CALCIUM SERPL-MCNC: 8.9 MG/DL (ref 8.5–10.1)
CHLORIDE SERPL-SCNC: 98 MMOL/L (ref 98–107)
CO2 SERPL-SCNC: 28 MMOL/L (ref 21–32)
CREAT SERPL-MCNC: 0.85 MG/DL (ref 0.7–1.3)
DIFFERENTIAL METHOD BLD: ABNORMAL
EGFR (NO RACE VARIABLE) (RUSH/TITUS): 104 ML/MIN/1.73M²
EOSINOPHIL # BLD AUTO: 0.16 K/UL (ref 0–0.5)
EOSINOPHIL NFR BLD AUTO: 2.6 % (ref 1–4)
ERYTHROCYTE [DISTWIDTH] IN BLOOD BY AUTOMATED COUNT: 12.3 % (ref 11.5–14.5)
GLUCOSE SERPL-MCNC: 148 MG/DL (ref 70–105)
GLUCOSE SERPL-MCNC: 208 MG/DL (ref 70–105)
GLUCOSE SERPL-MCNC: 215 MG/DL (ref 74–106)
GLUCOSE SERPL-MCNC: 256 MG/DL (ref 70–105)
GLUCOSE SERPL-MCNC: 266 MG/DL (ref 70–105)
GLUCOSE SERPL-MCNC: 316 MG/DL (ref 70–105)
HCT VFR BLD AUTO: 38.7 % (ref 40–54)
HGB BLD-MCNC: 13 G/DL (ref 13.5–18)
IMM GRANULOCYTES # BLD AUTO: 0.02 K/UL (ref 0–0.04)
IMM GRANULOCYTES NFR BLD: 0.3 % (ref 0–0.4)
LYMPHOCYTES # BLD AUTO: 1.87 K/UL (ref 1–4.8)
LYMPHOCYTES NFR BLD AUTO: 29.9 % (ref 27–41)
MCH RBC QN AUTO: 29.9 PG (ref 27–31)
MCHC RBC AUTO-ENTMCNC: 33.6 G/DL (ref 32–36)
MCV RBC AUTO: 89 FL (ref 80–96)
MICROORGANISM SPEC CULT: ABNORMAL
MONOCYTES # BLD AUTO: 0.45 K/UL (ref 0–0.8)
MONOCYTES NFR BLD AUTO: 7.2 % (ref 2–6)
MPC BLD CALC-MCNC: 9.7 FL (ref 9.4–12.4)
NEUTROPHILS # BLD AUTO: 3.68 K/UL (ref 1.8–7.7)
NEUTROPHILS NFR BLD AUTO: 58.7 % (ref 53–65)
NRBC # BLD AUTO: 0 X10E3/UL
NRBC, AUTO (.00): 0 %
PLATELET # BLD AUTO: 417 K/UL (ref 150–400)
POTASSIUM SERPL-SCNC: 3.7 MMOL/L (ref 3.5–5.1)
RBC # BLD AUTO: 4.35 M/UL (ref 4.6–6.2)
SODIUM SERPL-SCNC: 136 MMOL/L (ref 136–145)
VANCOMYCIN TROUGH SERPL-MCNC: 33.3 ΜG/ML (ref 10–20)
WBC # BLD AUTO: 6.26 K/UL (ref 4.5–11)

## 2023-04-28 PROCEDURE — 80202 ASSAY OF VANCOMYCIN: CPT | Performed by: SURGERY

## 2023-04-28 PROCEDURE — 63600175 PHARM REV CODE 636 W HCPCS: Performed by: SURGERY

## 2023-04-28 PROCEDURE — 25000003 PHARM REV CODE 250: Performed by: SURGERY

## 2023-04-28 PROCEDURE — 99232 SBSQ HOSP IP/OBS MODERATE 35: CPT | Mod: GC,,, | Performed by: FAMILY MEDICINE

## 2023-04-28 PROCEDURE — 85025 COMPLETE CBC W/AUTO DIFF WBC: CPT

## 2023-04-28 PROCEDURE — 94761 N-INVAS EAR/PLS OXIMETRY MLT: CPT

## 2023-04-28 PROCEDURE — 99232 PR SUBSEQUENT HOSPITAL CARE,LEVL II: ICD-10-PCS | Mod: GC,,, | Performed by: FAMILY MEDICINE

## 2023-04-28 PROCEDURE — 82962 GLUCOSE BLOOD TEST: CPT

## 2023-04-28 PROCEDURE — 11000001 HC ACUTE MED/SURG PRIVATE ROOM

## 2023-04-28 PROCEDURE — 25000003 PHARM REV CODE 250

## 2023-04-28 PROCEDURE — 63600175 PHARM REV CODE 636 W HCPCS

## 2023-04-28 PROCEDURE — 80048 BASIC METABOLIC PNL TOTAL CA: CPT

## 2023-04-28 RX ORDER — AMOXICILLIN AND CLAVULANATE POTASSIUM 875; 125 MG/1; MG/1
1 TABLET, FILM COATED ORAL EVERY 12 HOURS
Status: DISCONTINUED | OUTPATIENT
Start: 2023-04-28 | End: 2023-04-29

## 2023-04-28 RX ORDER — AMOXICILLIN AND CLAVULANATE POTASSIUM 875; 125 MG/1; MG/1
1 TABLET, FILM COATED ORAL EVERY 12 HOURS
Status: DISCONTINUED | OUTPATIENT
Start: 2023-04-28 | End: 2023-04-28

## 2023-04-28 RX ADMIN — ASPIRIN 81 MG: 81 TABLET, COATED ORAL at 08:04

## 2023-04-28 RX ADMIN — HYDROCODONE BITARTRATE AND ACETAMINOPHEN 1 TABLET: 7.5; 325 TABLET ORAL at 06:04

## 2023-04-28 RX ADMIN — PRAVASTATIN SODIUM 20 MG: 10 TABLET ORAL at 08:04

## 2023-04-28 RX ADMIN — AMOXICILLIN AND CLAVULANATE POTASSIUM 1 TABLET: 875; 125 TABLET, FILM COATED ORAL at 08:04

## 2023-04-28 RX ADMIN — INSULIN ASPART 6 UNITS: 100 INJECTION, SOLUTION INTRAVENOUS; SUBCUTANEOUS at 06:04

## 2023-04-28 RX ADMIN — INSULIN ASPART 3 UNITS: 100 INJECTION, SOLUTION INTRAVENOUS; SUBCUTANEOUS at 04:04

## 2023-04-28 RX ADMIN — INSULIN ASPART 2 UNITS: 100 INJECTION, SOLUTION INTRAVENOUS; SUBCUTANEOUS at 12:04

## 2023-04-28 RX ADMIN — INSULIN ASPART 3 UNITS: 100 INJECTION, SOLUTION INTRAVENOUS; SUBCUTANEOUS at 07:04

## 2023-04-28 RX ADMIN — INSULIN ASPART 3 UNITS: 100 INJECTION, SOLUTION INTRAVENOUS; SUBCUTANEOUS at 11:04

## 2023-04-28 RX ADMIN — HYDROCHLOROTHIAZIDE 25 MG: 25 TABLET ORAL at 08:04

## 2023-04-28 RX ADMIN — INSULIN DETEMIR 20 UNITS: 100 INJECTION, SOLUTION SUBCUTANEOUS at 08:04

## 2023-04-28 RX ADMIN — PIPERACILLIN AND TAZOBACTAM 4.5 G: 4; .5 INJECTION, POWDER, FOR SOLUTION INTRAVENOUS; PARENTERAL at 01:04

## 2023-04-28 RX ADMIN — VANCOMYCIN HYDROCHLORIDE 1500 MG: 500 INJECTION, POWDER, LYOPHILIZED, FOR SOLUTION INTRAVENOUS at 02:04

## 2023-04-28 RX ADMIN — PIPERACILLIN AND TAZOBACTAM 4.5 G: 4; .5 INJECTION, POWDER, FOR SOLUTION INTRAVENOUS; PARENTERAL at 04:04

## 2023-04-28 NOTE — PROGRESS NOTES
"[facility]  [facility] Medicine  Progress Note    Patient Name: James Bolaños Jr.  MRN: [id number]  Patient Class: IP- Inpatient   Admission Date: 4/26/2023  Length of Stay: 2 days  Attending Physician: Edith Gatica DO  Primary Care Provider: [facility]        Subjective:     Principal Problem:Diabetic foot ulcer associated with type 2 diabetes mellitus        HPI:  Patient is a 51 yo M with a PMH of HTN and T2DM presenting to [facility] via EMS from [facility] for right big toe ulcer and swelling. Pt reports that the swelling and ulcer have been bothering him for about 1 week. He states he thought it was just a blister from wearing his boots, but he took his boot off a few days ago and states his foot "didn't feel right". Pt denies any pain but does state it throbs. Pt also reports redness to his big toe and foot. Pt reports polyuria, but denies fever, chills, chest pain, SOB, abdominal pain.N/V, constipation, diarrhea, dysuria.    The patient had the second toe on his right foot amputated in October 2021 with Dr. Tan. Pt is supposed to take aspirin, pravastatin, metformin, and insulin but he admits he has not being consistently compliant with his medications. Pt also notes he does not check his blood sugars at home.    In the ED, initial presenting vitals were /86, HR 86, RR 16, T 98 °F, Sp)2 98% on RA. Labs demonstrated: WBC 7.9, Hgb 12.7, Hct 37.7, Plts 383, Na 135, K 3.6, BUN 10, Cr 0.81, Glucose 249, , Alb 2.9, Lactic Acid 0.8, AST 8, ALT 15. The patient was given IV NS 1L bolus x1, Zosyn 4.5g IV x1 and Vancomycin 1,500 mg IV x1.     The patient was admitted to the [facility] family medicine service under the direct supervision of Dr. En DO for the continued care and medical management of this patient.      Overview/Hospital Course:  4/27/23: No overnight events. Pt underwent surgical debridement of R toe this AM with Dr. Tan. Wound and " bone cultures pending.  Continue IV antibiotics. Blood cultures pending. Wound care on board for daily care.     4/28/23: No acute overnight events. Patient did well with PT/OT. Pt has urinated since surgery but denies having a BM yet. Wound and bone cultures showed heavy growth Streptococcus agalactiae (Group B). Blood cultures negative at 24 hours. Continue IV antibiotics (Day 3: 4/26 - ). Wound care on board.       Interval History: Pt resting comfortably in bed. No acute overnight events. Patient did well with PT/OT. Pt has urinated since surgery but denies having a BM yet. Wound and bone cultures showed heavy growth Streptococcus agalactiae (Group B). Blood cultures negative at 24 hours. Continue IV antibiotics (Day 3: 4/26 - ). Wound care on board.     Review of Systems   Constitutional:  Negative for fatigue and fever.   HENT:  Negative for ear discharge, ear pain, facial swelling, sinus pressure, sinus pain and sneezing.    Eyes:  Negative for pain, discharge and itching.   Respiratory:  Negative for cough and shortness of breath.    Cardiovascular: Negative.    Gastrointestinal:  Negative for abdominal distention, abdominal pain, anal bleeding, constipation, diarrhea and nausea.   Endocrine: Negative.    Genitourinary:  Negative for difficulty urinating, flank pain and frequency.   Musculoskeletal:  Positive for arthralgias.   Skin:  Positive for wound.   Allergic/Immunologic: Negative.    Neurological:  Negative for light-headedness and headaches.   Psychiatric/Behavioral: Negative.     Objective:     Vital Signs (Most Recent):  Temp: 98.5 °F (36.9 °C) (04/28/23 0817)  Pulse: 74 (04/28/23 0817)  Resp: 18 (04/28/23 0817)  BP: 110/75 (04/28/23 0817)  SpO2: 97 % (04/28/23 0819) Vital Signs (24h Range):  Temp:  [98.1 °F (36.7 °C)-98.6 °F (37 °C)] 98.5 °F (36.9 °C)  Pulse:  [74-86] 74  Resp:  [16-18] 18  SpO2:  [95 %-97 %] 97 %  BP: (104-115)/(68-77) 110/75     Weight: 82.6 kg (182 lb)  Body mass index is 28.51  kg/m².    Intake/Output Summary (Last 24 hours) at 4/28/2023 1121  Last data filed at 4/28/2023 0557  Gross per 24 hour   Intake 350 ml   Output 1800 ml   Net -1450 ml      Physical Exam  Constitutional:       Appearance: Normal appearance. He is normal weight.   HENT:      Head: Normocephalic and atraumatic.      Right Ear: Tympanic membrane normal.      Left Ear: Tympanic membrane normal.      Nose: Nose normal.      Mouth/Throat:      Mouth: Mucous membranes are moist.      Pharynx: Oropharynx is clear.   Eyes:      Conjunctiva/sclera: Conjunctivae normal.      Pupils: Pupils are equal, round, and reactive to light.   Cardiovascular:      Rate and Rhythm: Normal rate and regular rhythm.      Pulses: Normal pulses.      Heart sounds: Normal heart sounds.   Pulmonary:      Effort: Pulmonary effort is normal.      Breath sounds: Normal breath sounds.   Abdominal:      General: Abdomen is flat. Bowel sounds are normal.   Musculoskeletal:         General: Deformity present. Normal range of motion.      Cervical back: Normal range of motion.      Comments: Right great toe with dressing in place s/p debridement   Second toe on R amputated   Skin:     General: Skin is warm and dry.      Capillary Refill: Capillary refill takes less than 2 seconds.   Neurological:      Mental Status: He is alert and oriented to person, place, and time.   Psychiatric:         Mood and Affect: Mood normal.       Significant Labs: All pertinent labs within the past 24 hours have been reviewed.    Significant Imaging: I have reviewed all pertinent imaging results/findings within the past 24 hours.      Assessment/Plan:      * Diabetic foot ulcer associated with type 2 diabetes mellitus  Patient's FSGs are uncontrolled due to hyperglycemia on current medication regimen.  Last A1c reviewed-   Lab Results   Component Value Date    HGBA1C 11.7 (H) 04/26/2023     Most recent fingerstick glucose reviewed- No results for input(s): POCTGLUCOSE in the  last 24 hours.  Current correctional scale  Medium  Maintain anti-hyperglycemic dose as follows-   Antihyperglycemics (From admission, onward)    Start     Stop Route Frequency Ordered    04/28/23 2100  insulin detemir U-100 injection 20 Units         -- SubQ Nightly 04/28/23 0820    04/27/23 1130  insulin aspart U-100 injection 3 Units         -- SubQ 3 times daily with meals 04/27/23 0724    04/26/23 1455  insulin aspart U-100 injection 1-10 Units         -- SubQ Every 6 hours PRN 04/26/23 1355        Hold Oral hypoglycemics while patient is in the hospital.    Patient has not been complaint with medication and diet  Surgery Consulted thank them for their assistance  Wound Care consulted thank them for their assistance  Wound Culture and Blood cultures pending  ESR, CRP pending  Xray of Right foot pending  Vancomycin pharmacy to Dose  Zosyn 4.45 Q8hr will monitor cultures    4/27/23: surgical debridement of ulcer done today    4/28/23: bone culture/ wound culture GBS: awaiting sensitivity will consider discharge soon.      Hypertension  Patient has not taking any medication in months  Start HCTZ 25 mg daily  Hydralazine 10 mg IVP PRN        Hyperlipidemia  Pravastatin 20 mg  Aspirin 81 mg        VTE Risk Mitigation (From admission, onward)         Ordered     IP VTE LOW RISK PATIENT  Once         04/26/23 1346     Place sequential compression device  Until discontinued         04/26/23 1346                Discharge Planning   MÓNICA:      Code Status: Full Code   Is the patient medically ready for discharge?:     Reason for patient still in hospital (select all that apply): Treatment  Discharge Plan A: Home with family                  Aaron Magaña MD  Department of Hospital Medicine   [facility]

## 2023-04-28 NOTE — PROGRESS NOTES
Trough drawn 4/28/23 at 0459 was drawn after the dose was administered and is therefore not a true trough. Trough will be re-drawn on 4/29/23 at 0230, 30 minutes prior to dose administration. Current regime.

## 2023-04-28 NOTE — PROGRESS NOTES
Patient doing well.  Dressing changed by Dr. Tan. Bone culture heavy growth Strep agalactiae.  Awaiting sensitivity.  On Zosyn and vanc.  Will need IV antibiotics for likely up to 6 weeks. Once sensitivity back, will determine outpt vs LTAC/swingbed.

## 2023-04-28 NOTE — PLAN OF CARE
Problem: Adult Inpatient Plan of Care  Goal: Plan of Care Review  Outcome: Ongoing, Progressing  Goal: Patient-Specific Goal (Individualized)  Outcome: Ongoing, Progressing  Goal: Absence of Hospital-Acquired Illness or Injury  Outcome: Ongoing, Progressing  Goal: Optimal Comfort and Wellbeing  Outcome: Ongoing, Progressing  Goal: Readiness for Transition of Care  Outcome: Ongoing, Progressing     Problem: Diabetes Comorbidity  Goal: Blood Glucose Level Within Targeted Range  Outcome: Ongoing, Not Progressing     Problem: Infection  Goal: Absence of Infection Signs and Symptoms  Outcome: Ongoing, Progressing     Problem: Fall Injury Risk  Goal: Absence of Fall and Fall-Related Injury  Outcome: Ongoing, Progressing

## 2023-04-28 NOTE — H&P
"[facility]  Hospital Medicine  History & Physical    Patient Name: James Bolaños Jr.  MRN: [id number]  Patient Class: IP- Inpatient  Admission Date: 4/26/2023  Attending Physician: Edith Gatica DO   Primary Care Provider: [facility]         Patient information was obtained from patient and ER records.     Subjective:     Principal Problem:Diabetic foot ulcer associated with type 2 diabetes mellitus    Chief Complaint:   Chief Complaint   Patient presents with    Wound Infection     Pt sent from [facility] in [place] for a wound infection on the right foot. Pt has had previous toe amputation on that foot.         HPI: Patient is a 53 yo M with a PMH of HTN and T2DM presenting to [facility] ED via EMS from [facility] for right big toe ulcer and swelling. Pt reports that the swelling and ulcer have been bothering him for about 1 week. He states he thought it was just a blister from wearing his boots, but he took his boot off a few days ago and states his foot "didn't feel right". Pt denies any pain but does state it throbs. Pt also reports redness to his big toe and foot. Pt reports polyuria, but denies fever, chills, chest pain, SOB, abdominal pain.N/V, constipation, diarrhea, dysuria.    The patient had the second toe on his right foot amputated in October 2021 with Dr. Tan. Pt is supposed to take aspirin, pravastatin, metformin, and insulin but he admits he has not being consistently compliant with his medications. Pt also notes he does not check his blood sugars at home.    In the ED, initial presenting vitals were /86, HR 86, RR 16, T 98 °F, Sp)2 98% on RA. Labs demonstrated: WBC 7.9, Hgb 12.7, Hct 37.7, Plts 383, Na 135, K 3.6, BUN 10, Cr 0.81, Glucose 249, , Alb 2.9, Lactic Acid 0.8, AST 8, ALT 15. The patient was given IV NS 1L bolus x1, Zosyn 4.5g IV x1 and Vancomycin 1,500 mg IV x1.     The patient was admitted to the [facility] " family medicine service under the direct supervision of Dr. En DO for the continued care and medical management of this patient.      Interval History: Pt resting comfortably in bed. No acute overnight events. Patient did well with PT/OT. Pt has urinated since surgery but denies having a BM yet. Wound and bone cultures showed heavy growth Streptococcus agalactiae (Group B). Blood cultures negative at 24 hours. Continue IV antibiotics (Day 3: 4/26 - ). Wound care on board.     Review of Systems   Constitutional:  Negative for fatigue and fever.   HENT:  Negative for ear discharge, ear pain, facial swelling, sinus pressure, sinus pain and sneezing.    Eyes:  Negative for pain, discharge and itching.   Respiratory:  Negative for cough and shortness of breath.    Cardiovascular: Negative.    Gastrointestinal:  Negative for abdominal distention, abdominal pain, anal bleeding, constipation, diarrhea and nausea.   Endocrine: Negative.    Genitourinary:  Negative for difficulty urinating, flank pain and frequency.   Musculoskeletal:  Positive for arthralgias.   Skin:  Positive for wound.   Allergic/Immunologic: Negative.    Neurological:  Negative for light-headedness and headaches.   Psychiatric/Behavioral: Negative.     Objective:     Vital Signs (Most Recent):  Temp: 98.5 °F (36.9 °C) (04/28/23 0817)  Pulse: 74 (04/28/23 0817)  Resp: 18 (04/28/23 0817)  BP: 110/75 (04/28/23 0817)  SpO2: 97 % (04/28/23 0819) Vital Signs (24h Range):  Temp:  [98.1 °F (36.7 °C)-98.6 °F (37 °C)] 98.5 °F (36.9 °C)  Pulse:  [74-86] 74  Resp:  [16-18] 18  SpO2:  [95 %-97 %] 97 %  BP: (104-115)/(68-77) 110/75     Weight: 82.6 kg (182 lb)  Body mass index is 28.51 kg/m².    Intake/Output Summary (Last 24 hours) at 4/28/2023 1121  Last data filed at 4/28/2023 0557  Gross per 24 hour   Intake 350 ml   Output 1800 ml   Net -1450 ml      Physical Exam  Constitutional:       Appearance: Normal appearance. He is normal weight.   HENT:      Head:  Normocephalic and atraumatic.      Right Ear: Tympanic membrane normal.      Left Ear: Tympanic membrane normal.      Nose: Nose normal.      Mouth/Throat:      Mouth: Mucous membranes are moist.      Pharynx: Oropharynx is clear.   Eyes:      Conjunctiva/sclera: Conjunctivae normal.      Pupils: Pupils are equal, round, and reactive to light.   Cardiovascular:      Rate and Rhythm: Normal rate and regular rhythm.      Pulses: Normal pulses.      Heart sounds: Normal heart sounds.   Pulmonary:      Effort: Pulmonary effort is normal.      Breath sounds: Normal breath sounds.   Abdominal:      General: Abdomen is flat. Bowel sounds are normal.   Musculoskeletal:         General: Deformity present. Normal range of motion.      Cervical back: Normal range of motion.      Comments: Right great toe with dressing in place s/p debridement   Second toe on R amputated   Skin:     General: Skin is warm and dry.      Capillary Refill: Capillary refill takes less than 2 seconds.   Neurological:      Mental Status: He is alert and oriented to person, place, and time.   Psychiatric:         Mood and Affect: Mood normal.       Significant Labs: All pertinent labs within the past 24 hours have been reviewed.    Significant Imaging: I have reviewed all pertinent imaging results/findings within the past 24 hours.    Assessment/Plan:     * Diabetic foot ulcer associated with type 2 diabetes mellitus  Patient's FSGs are uncontrolled due to hyperglycemia on current medication regimen.  Last A1c reviewed-   Lab Results   Component Value Date    HGBA1C 11.7 (H) 04/26/2023     Most recent fingerstick glucose reviewed- No results for input(s): POCTGLUCOSE in the last 24 hours.  Current correctional scale  Medium  Maintain anti-hyperglycemic dose as follows-   Antihyperglycemics (From admission, onward)    Start     Stop Route Frequency Ordered    04/28/23 2100  insulin detemir U-100 injection 20 Units         -- SubQ Nightly 04/28/23 0820     04/27/23 1130  insulin aspart U-100 injection 3 Units         -- SubQ 3 times daily with meals 04/27/23 0724    04/26/23 1455  insulin aspart U-100 injection 1-10 Units         -- SubQ Every 6 hours PRN 04/26/23 1355        Hold Oral hypoglycemics while patient is in the hospital.    Patient has not been complaint with medication and diet  Surgery Consulted thank them for their assistance  Wound Care consulted thank them for their assistance  Wound Culture and Blood cultures pending  ESR, CRP pending  Xray of Right foot pending  Vancomycin pharmacy to Dose  Zosyn 4.45 Q8hr will monitor cultures    4/27/23: surgical debridement of ulcer done today    4/28/23: bone culture/ wound culture GBS: awaiting sensitivity will consider discharge soon.      Hypertension  Patient has not taking any medication in months  Start HCTZ 25 mg daily  Hydralazine 10 mg IVP PRN        Hyperlipidemia  Pravastatin 20 mg  Aspirin 81 mg        VTE Risk Mitigation (From admission, onward)         Ordered     IP VTE LOW RISK PATIENT  Once         04/26/23 1346     Place sequential compression device  Until discontinued         04/26/23 1346                           Aaron Magaña MD  Department of Hospital Medicine  [facility]

## 2023-04-28 NOTE — SUBJECTIVE & OBJECTIVE
Interval History: Pt resting comfortably in bed. No acute overnight events. Patient did well with PT/OT. Pt has urinated since surgery but denies having a BM yet. Wound and bone cultures showed heavy growth Streptococcus agalactiae (Group B). Blood cultures negative at 24 hours. Continue IV antibiotics (Day 3: 4/26 - ). Wound care on board.     Review of Systems   Constitutional:  Negative for fatigue and fever.   HENT:  Negative for ear discharge, ear pain, facial swelling, sinus pressure, sinus pain and sneezing.    Eyes:  Negative for pain, discharge and itching.   Respiratory:  Negative for cough and shortness of breath.    Cardiovascular: Negative.    Gastrointestinal:  Negative for abdominal distention, abdominal pain, anal bleeding, constipation, diarrhea and nausea.   Endocrine: Negative.    Genitourinary:  Negative for difficulty urinating, flank pain and frequency.   Musculoskeletal:  Positive for arthralgias.   Skin:  Positive for wound.   Allergic/Immunologic: Negative.    Neurological:  Negative for light-headedness and headaches.   Psychiatric/Behavioral: Negative.     Objective:     Vital Signs (Most Recent):  Temp: 98.5 °F (36.9 °C) (04/28/23 0817)  Pulse: 74 (04/28/23 0817)  Resp: 18 (04/28/23 0817)  BP: 110/75 (04/28/23 0817)  SpO2: 97 % (04/28/23 0819) Vital Signs (24h Range):  Temp:  [98.1 °F (36.7 °C)-98.6 °F (37 °C)] 98.5 °F (36.9 °C)  Pulse:  [74-86] 74  Resp:  [16-18] 18  SpO2:  [95 %-97 %] 97 %  BP: (104-115)/(68-77) 110/75     Weight: 82.6 kg (182 lb)  Body mass index is 28.51 kg/m².    Intake/Output Summary (Last 24 hours) at 4/28/2023 1121  Last data filed at 4/28/2023 0557  Gross per 24 hour   Intake 350 ml   Output 1800 ml   Net -1450 ml      Physical Exam  Constitutional:       Appearance: Normal appearance. He is normal weight.   HENT:      Head: Normocephalic and atraumatic.      Right Ear: Tympanic membrane normal.      Left Ear: Tympanic membrane normal.      Nose: Nose normal.       Mouth/Throat:      Mouth: Mucous membranes are moist.      Pharynx: Oropharynx is clear.   Eyes:      Conjunctiva/sclera: Conjunctivae normal.      Pupils: Pupils are equal, round, and reactive to light.   Cardiovascular:      Rate and Rhythm: Normal rate and regular rhythm.      Pulses: Normal pulses.      Heart sounds: Normal heart sounds.   Pulmonary:      Effort: Pulmonary effort is normal.      Breath sounds: Normal breath sounds.   Abdominal:      General: Abdomen is flat. Bowel sounds are normal.   Musculoskeletal:         General: Deformity present. Normal range of motion.      Cervical back: Normal range of motion.      Comments: Right great toe with dressing in place s/p debridement   Second toe on R amputated   Skin:     General: Skin is warm and dry.      Capillary Refill: Capillary refill takes less than 2 seconds.   Neurological:      Mental Status: He is alert and oriented to person, place, and time.   Psychiatric:         Mood and Affect: Mood normal.       Significant Labs: All pertinent labs within the past 24 hours have been reviewed.    Significant Imaging: I have reviewed all pertinent imaging results/findings within the past 24 hours.

## 2023-04-28 NOTE — ASSESSMENT & PLAN NOTE
Patient's FSGs are uncontrolled due to hyperglycemia on current medication regimen.  Last A1c reviewed-   Lab Results   Component Value Date    HGBA1C 11.7 (H) 04/26/2023     Most recent fingerstick glucose reviewed- No results for input(s): POCTGLUCOSE in the last 24 hours.  Current correctional scale  Medium  Maintain anti-hyperglycemic dose as follows-   Antihyperglycemics (From admission, onward)    Start     Stop Route Frequency Ordered    04/28/23 2100  insulin detemir U-100 injection 20 Units         -- SubQ Nightly 04/28/23 0820    04/27/23 1130  insulin aspart U-100 injection 3 Units         -- SubQ 3 times daily with meals 04/27/23 0724    04/26/23 1455  insulin aspart U-100 injection 1-10 Units         -- SubQ Every 6 hours PRN 04/26/23 1355        Hold Oral hypoglycemics while patient is in the hospital.    Patient has not been complaint with medication and diet  Surgery Consulted thank them for their assistance  Wound Care consulted thank them for their assistance  Wound Culture and Blood cultures pending  ESR, CRP pending  Xray of Right foot pending  Vancomycin pharmacy to Dose  Zosyn 4.45 Q8hr will monitor cultures    4/27/23: surgical debridement of ulcer done today    4/28/23: bone culture/ wound culture GBS: awaiting sensitivity will consider discharge soon.

## 2023-04-29 LAB
ANION GAP SERPL CALCULATED.3IONS-SCNC: 11 MMOL/L (ref 7–16)
BASOPHILS # BLD AUTO: 0.07 K/UL (ref 0–0.2)
BASOPHILS NFR BLD AUTO: 1 % (ref 0–1)
BUN SERPL-MCNC: 11 MG/DL (ref 7–18)
BUN/CREAT SERPL: 12 (ref 6–20)
CALCIUM SERPL-MCNC: 9.2 MG/DL (ref 8.5–10.1)
CHLORIDE SERPL-SCNC: 99 MMOL/L (ref 98–107)
CO2 SERPL-SCNC: 32 MMOL/L (ref 21–32)
CREAT SERPL-MCNC: 0.89 MG/DL (ref 0.7–1.3)
DIFFERENTIAL METHOD BLD: ABNORMAL
EGFR (NO RACE VARIABLE) (RUSH/TITUS): 102 ML/MIN/1.73M2
EOSINOPHIL # BLD AUTO: 0.23 K/UL (ref 0–0.5)
EOSINOPHIL NFR BLD AUTO: 3.4 % (ref 1–4)
ERYTHROCYTE [DISTWIDTH] IN BLOOD BY AUTOMATED COUNT: 12.5 % (ref 11.5–14.5)
GLUCOSE SERPL-MCNC: 100 MG/DL (ref 70–105)
GLUCOSE SERPL-MCNC: 245 MG/DL (ref 74–106)
GLUCOSE SERPL-MCNC: 269 MG/DL (ref 70–105)
GLUCOSE SERPL-MCNC: 273 MG/DL (ref 70–105)
GLUCOSE SERPL-MCNC: 277 MG/DL (ref 70–105)
HCT VFR BLD AUTO: 40.9 % (ref 40–54)
HGB BLD-MCNC: 13.9 G/DL (ref 13.5–18)
IMM GRANULOCYTES # BLD AUTO: 0.02 K/UL (ref 0–0.04)
IMM GRANULOCYTES NFR BLD: 0.3 % (ref 0–0.4)
LYMPHOCYTES # BLD AUTO: 2.32 K/UL (ref 1–4.8)
LYMPHOCYTES NFR BLD AUTO: 34.5 % (ref 27–41)
MCH RBC QN AUTO: 30.4 PG (ref 27–31)
MCHC RBC AUTO-ENTMCNC: 34 G/DL (ref 32–36)
MCV RBC AUTO: 89.5 FL (ref 80–96)
MONOCYTES # BLD AUTO: 0.51 K/UL (ref 0–0.8)
MONOCYTES NFR BLD AUTO: 7.6 % (ref 2–6)
MPC BLD CALC-MCNC: 9.4 FL (ref 9.4–12.4)
NEUTROPHILS # BLD AUTO: 3.58 K/UL (ref 1.8–7.7)
NEUTROPHILS NFR BLD AUTO: 53.2 % (ref 53–65)
NRBC # BLD AUTO: 0 X10E3/UL
NRBC, AUTO (.00): 0 %
PLATELET # BLD AUTO: 451 K/UL (ref 150–400)
POTASSIUM SERPL-SCNC: 3.7 MMOL/L (ref 3.5–5.1)
RBC # BLD AUTO: 4.57 M/UL (ref 4.6–6.2)
SODIUM SERPL-SCNC: 138 MMOL/L (ref 136–145)
VANCOMYCIN TROUGH SERPL-MCNC: 4.3 ΜG/ML (ref 10–20)
WBC # BLD AUTO: 6.73 K/UL (ref 4.5–11)

## 2023-04-29 PROCEDURE — 25000003 PHARM REV CODE 250

## 2023-04-29 PROCEDURE — 85025 COMPLETE CBC W/AUTO DIFF WBC: CPT

## 2023-04-29 PROCEDURE — 63600175 PHARM REV CODE 636 W HCPCS

## 2023-04-29 PROCEDURE — 99232 PR SUBSEQUENT HOSPITAL CARE,LEVL II: ICD-10-PCS | Mod: GC,,, | Performed by: FAMILY MEDICINE

## 2023-04-29 PROCEDURE — 82962 GLUCOSE BLOOD TEST: CPT

## 2023-04-29 PROCEDURE — 99232 SBSQ HOSP IP/OBS MODERATE 35: CPT | Mod: GC,,, | Performed by: FAMILY MEDICINE

## 2023-04-29 PROCEDURE — 94761 N-INVAS EAR/PLS OXIMETRY MLT: CPT

## 2023-04-29 PROCEDURE — 25000003 PHARM REV CODE 250: Performed by: SURGERY

## 2023-04-29 PROCEDURE — 63600175 PHARM REV CODE 636 W HCPCS: Performed by: SURGERY

## 2023-04-29 PROCEDURE — 80202 ASSAY OF VANCOMYCIN: CPT | Performed by: FAMILY MEDICINE

## 2023-04-29 PROCEDURE — 80048 BASIC METABOLIC PNL TOTAL CA: CPT

## 2023-04-29 PROCEDURE — 11000001 HC ACUTE MED/SURG PRIVATE ROOM

## 2023-04-29 RX ORDER — INSULIN ASPART 100 [IU]/ML
5 INJECTION, SOLUTION INTRAVENOUS; SUBCUTANEOUS
Status: DISCONTINUED | OUTPATIENT
Start: 2023-04-29 | End: 2023-04-30

## 2023-04-29 RX ADMIN — INSULIN ASPART 6 UNITS: 100 INJECTION, SOLUTION INTRAVENOUS; SUBCUTANEOUS at 06:04

## 2023-04-29 RX ADMIN — INSULIN ASPART 3 UNITS: 100 INJECTION, SOLUTION INTRAVENOUS; SUBCUTANEOUS at 12:04

## 2023-04-29 RX ADMIN — INSULIN DETEMIR 25 UNITS: 100 INJECTION, SOLUTION SUBCUTANEOUS at 09:04

## 2023-04-29 RX ADMIN — AMOXICILLIN AND CLAVULANATE POTASSIUM 1 TABLET: 875; 125 TABLET, FILM COATED ORAL at 08:04

## 2023-04-29 RX ADMIN — INSULIN ASPART 5 UNITS: 100 INJECTION, SOLUTION INTRAVENOUS; SUBCUTANEOUS at 01:04

## 2023-04-29 RX ADMIN — CEFTRIAXONE SODIUM 2 G: 2 INJECTION, POWDER, FOR SOLUTION INTRAMUSCULAR; INTRAVENOUS at 10:04

## 2023-04-29 RX ADMIN — ASPIRIN 81 MG: 81 TABLET, COATED ORAL at 08:04

## 2023-04-29 RX ADMIN — HYDROCHLOROTHIAZIDE 25 MG: 25 TABLET ORAL at 08:04

## 2023-04-29 RX ADMIN — HYDROCODONE BITARTRATE AND ACETAMINOPHEN 1 TABLET: 7.5; 325 TABLET ORAL at 10:04

## 2023-04-29 RX ADMIN — INSULIN ASPART 5 UNITS: 100 INJECTION, SOLUTION INTRAVENOUS; SUBCUTANEOUS at 08:04

## 2023-04-29 RX ADMIN — INSULIN ASPART 6 UNITS: 100 INJECTION, SOLUTION INTRAVENOUS; SUBCUTANEOUS at 01:04

## 2023-04-29 RX ADMIN — PRAVASTATIN SODIUM 20 MG: 10 TABLET ORAL at 09:04

## 2023-04-29 RX ADMIN — INSULIN ASPART 5 UNITS: 100 INJECTION, SOLUTION INTRAVENOUS; SUBCUTANEOUS at 06:04

## 2023-04-29 RX ADMIN — VANCOMYCIN HYDROCHLORIDE 1500 MG: 500 INJECTION, POWDER, LYOPHILIZED, FOR SOLUTION INTRAVENOUS at 04:04

## 2023-04-29 NOTE — PROGRESS NOTES
"[facility]  [facility] Medicine  Progress Note    Patient Name: James Bolaños Jr.  MRN: [id number]  Patient Class: IP- Inpatient   Admission Date: 4/26/2023  Length of Stay: 3 days  Attending Physician: Edith Gatica DO  Primary Care Provider: [facility]        Subjective:     Principal Problem:Diabetic foot ulcer associated with type 2 diabetes mellitus        HPI:  Patient is a 51 yo M with a PMH of HTN and T2DM presenting to [facility] via EMS from [facility] for right big toe ulcer and swelling. Pt reports that the swelling and ulcer have been bothering him for about 1 week. He states he thought it was just a blister from wearing his boots, but he took his boot off a few days ago and states his foot "didn't feel right". Pt denies any pain but does state it throbs. Pt also reports redness to his big toe and foot. Pt reports polyuria, but denies fever, chills, chest pain, SOB, abdominal pain.N/V, constipation, diarrhea, dysuria.    The patient had the second toe on his right foot amputated in October 2021 with Dr. Tan. Pt is supposed to take aspirin, pravastatin, metformin, and insulin but he admits he has not being consistently compliant with his medications. Pt also notes he does not check his blood sugars at home.    In the ED, initial presenting vitals were /86, HR 86, RR 16, T 98 °F, Sp)2 98% on RA. Labs demonstrated: WBC 7.9, Hgb 12.7, Hct 37.7, Plts 383, Na 135, K 3.6, BUN 10, Cr 0.81, Glucose 249, , Alb 2.9, Lactic Acid 0.8, AST 8, ALT 15. The patient was given IV NS 1L bolus x1, Zosyn 4.5g IV x1 and Vancomycin 1,500 mg IV x1.     The patient was admitted to the [facility] family medicine service under the direct supervision of Dr. En DO for the continued care and medical management of this patient.      Overview/Hospital Course:  4/27/23: No overnight events. Pt underwent surgical debridement of R toe this AM with Dr. Tan. Wound and " bone cultures pending.  Continue IV antibiotics. Blood cultures pending. Wound care on board for daily care.     4/28/23: No acute overnight events. Patient did well with PT/OT. Pt has urinated since surgery but denies having a BM yet. Wound and bone cultures showed heavy growth Streptococcus agalactiae (Group B). Blood cultures negative at 24 hours. Continue IV antibiotics (Day 3: 4/26 - ). Wound care on board.       Interval History: Patient in bed in no distress this am. Wound and bone culture growing GBS. Will change vancomycin to Rocephin. Will get home health to [facility] with set up for outpatient IV treatment. Ordered a PICC line and get home health to also do wound care when patient is discharged.     Review of Systems   Constitutional:  Negative for fatigue and fever.   HENT:  Negative for ear discharge, ear pain, facial swelling, sinus pressure, sinus pain and sneezing.    Eyes:  Negative for pain, discharge and itching.   Respiratory:  Negative for cough and shortness of breath.    Cardiovascular: Negative.    Gastrointestinal:  Negative for abdominal distention, abdominal pain, anal bleeding, constipation, diarrhea and nausea.   Endocrine: Negative.    Genitourinary:  Negative for difficulty urinating, flank pain and frequency.   Musculoskeletal:  Positive for arthralgias.   Skin:  Positive for wound.   Allergic/Immunologic: Negative.    Neurological:  Negative for light-headedness and headaches.   Psychiatric/Behavioral: Negative.     Objective:     Vital Signs (Most Recent):  Temp: 97.9 °F (36.6 °C) (04/29/23 1100)  Pulse: 92 (04/29/23 1100)  Resp: 17 (04/29/23 1100)  BP: 122/81 (04/29/23 1100)  SpO2: 95 % (04/29/23 1100) Vital Signs (24h Range):  Temp:  [97.9 °F (36.6 °C)-99.1 °F (37.3 °C)] 97.9 °F (36.6 °C)  Pulse:  [80-95] 92  Resp:  [16-18] 17  SpO2:  [93 %-96 %] 95 %  BP: (107-123)/(73-88) 122/81     Weight: 82.6 kg (182 lb)  Body mass index is 28.51 kg/m².  No intake or output data in the 24 hours  ending 04/29/23 1328   Physical Exam  Constitutional:       Appearance: Normal appearance. He is normal weight.   HENT:      Head: Normocephalic and atraumatic.      Right Ear: Tympanic membrane normal.      Left Ear: Tympanic membrane normal.      Nose: Nose normal.      Mouth/Throat:      Mouth: Mucous membranes are moist.      Pharynx: Oropharynx is clear.   Eyes:      Conjunctiva/sclera: Conjunctivae normal.      Pupils: Pupils are equal, round, and reactive to light.   Cardiovascular:      Rate and Rhythm: Normal rate and regular rhythm.      Pulses: Normal pulses.      Heart sounds: Normal heart sounds.   Pulmonary:      Effort: Pulmonary effort is normal.      Breath sounds: Normal breath sounds.   Abdominal:      General: Abdomen is flat. Bowel sounds are normal.   Musculoskeletal:         General: Deformity present. Normal range of motion.      Cervical back: Normal range of motion.      Comments: Right great toe with dressing in place s/p debridement   Second toe on R amputated   Skin:     General: Skin is warm and dry.      Capillary Refill: Capillary refill takes less than 2 seconds.   Neurological:      Mental Status: He is alert and oriented to person, place, and time.   Psychiatric:         Mood and Affect: Mood normal.       Significant Labs: All pertinent labs within the past 24 hours have been reviewed.  BMP:   Recent Labs   Lab 04/29/23  0244   *      K 3.7   CL 99   CO2 32   BUN 11   CREATININE 0.89   CALCIUM 9.2     CBC:   Recent Labs   Lab 04/28/23  0800 04/29/23  0244   WBC 6.26 6.73   HGB 13.0* 13.9   HCT 38.7* 40.9   * 451*       Significant Imaging: I have reviewed all pertinent imaging results/findings within the past 24 hours.      Assessment/Plan:      * Diabetic foot ulcer associated with type 2 diabetes mellitus  Patient's FSGs are uncontrolled due to hyperglycemia on current medication regimen.  Last A1c reviewed-   Lab Results   Component Value Date    HGBA1C 11.7  (H) 04/26/2023     Most recent fingerstick glucose reviewed- No results for input(s): POCTGLUCOSE in the last 24 hours.  Current correctional scale  Medium  Maintain anti-hyperglycemic dose as follows-   Antihyperglycemics (From admission, onward)    Start     Stop Route Frequency Ordered    04/29/23 2100  insulin detemir U-100 injection 25 Units         -- SubQ Nightly 04/29/23 0708    04/29/23 0715  insulin aspart U-100 injection 5 Units         -- SubQ 3 times daily with meals 04/29/23 0626    04/26/23 1455  insulin aspart U-100 injection 1-10 Units         -- SubQ Every 6 hours PRN 04/26/23 1355        Hold Oral hypoglycemics while patient is in the hospital.    Patient has not been complaint with medication and diet  Surgery Consulted thank them for their assistance  Wound Care consulted thank them for their assistance  Wound Culture and Blood cultures pending  ESR, CRP pending  Xray of Right foot pending  Vancomycin pharmacy to Dose  Zosyn 4.45 Q8hr will monitor cultures    4/27/23: surgical debridement of ulcer done today    4/28/23: bone culture/ wound culture GBS: awaiting sensitivity will consider discharge soon    4/28/23: changed vancomycin to Rocephin. Will consider outpatient IV antibiotic on discharge. .      Hypertension  Patient has not taking any medication in months  Start HCTZ 25 mg daily  Hydralazine 10 mg IVP PRN        Hyperlipidemia  Pravastatin 20 mg  Aspirin 81 mg        VTE Risk Mitigation (From admission, onward)         Ordered     IP VTE LOW RISK PATIENT  Once         04/26/23 1346     Place sequential compression device  Until discontinued         04/26/23 1346                Discharge Planning   MÓNICA: 4/29/2023     Code Status: Full Code   Is the patient medically ready for discharge?:     Reason for patient still in hospital (select all that apply): Treatment  Discharge Plan A: Home with family                  Aaron Magaña MD  Department of Hospital Medicine   [facility]

## 2023-04-29 NOTE — SUBJECTIVE & OBJECTIVE
Interval History: Patient in bed in no distress this am. Wound and bone culture growing GBS. Will change vancomycin to Rocephin. Will get home health to [facility] with set up for outpatient IV treatment. Ordered a PICC line and get home health to also do wound care when patient is discharged.     Review of Systems   Constitutional:  Negative for fatigue and fever.   HENT:  Negative for ear discharge, ear pain, facial swelling, sinus pressure, sinus pain and sneezing.    Eyes:  Negative for pain, discharge and itching.   Respiratory:  Negative for cough and shortness of breath.    Cardiovascular: Negative.    Gastrointestinal:  Negative for abdominal distention, abdominal pain, anal bleeding, constipation, diarrhea and nausea.   Endocrine: Negative.    Genitourinary:  Negative for difficulty urinating, flank pain and frequency.   Musculoskeletal:  Positive for arthralgias.   Skin:  Positive for wound.   Allergic/Immunologic: Negative.    Neurological:  Negative for light-headedness and headaches.   Psychiatric/Behavioral: Negative.     Objective:     Vital Signs (Most Recent):  Temp: 97.9 °F (36.6 °C) (04/29/23 1100)  Pulse: 92 (04/29/23 1100)  Resp: 17 (04/29/23 1100)  BP: 122/81 (04/29/23 1100)  SpO2: 95 % (04/29/23 1100) Vital Signs (24h Range):  Temp:  [97.9 °F (36.6 °C)-99.1 °F (37.3 °C)] 97.9 °F (36.6 °C)  Pulse:  [80-95] 92  Resp:  [16-18] 17  SpO2:  [93 %-96 %] 95 %  BP: (107-123)/(73-88) 122/81     Weight: 82.6 kg (182 lb)  Body mass index is 28.51 kg/m².  No intake or output data in the 24 hours ending 04/29/23 1328   Physical Exam  Constitutional:       Appearance: Normal appearance. He is normal weight.   HENT:      Head: Normocephalic and atraumatic.      Right Ear: Tympanic membrane normal.      Left Ear: Tympanic membrane normal.      Nose: Nose normal.      Mouth/Throat:      Mouth: Mucous membranes are moist.      Pharynx: Oropharynx is clear.   Eyes:      Conjunctiva/sclera: Conjunctivae normal.       Pupils: Pupils are equal, round, and reactive to light.   Cardiovascular:      Rate and Rhythm: Normal rate and regular rhythm.      Pulses: Normal pulses.      Heart sounds: Normal heart sounds.   Pulmonary:      Effort: Pulmonary effort is normal.      Breath sounds: Normal breath sounds.   Abdominal:      General: Abdomen is flat. Bowel sounds are normal.   Musculoskeletal:         General: Deformity present. Normal range of motion.      Cervical back: Normal range of motion.      Comments: Right great toe with dressing in place s/p debridement   Second toe on R amputated   Skin:     General: Skin is warm and dry.      Capillary Refill: Capillary refill takes less than 2 seconds.   Neurological:      Mental Status: He is alert and oriented to person, place, and time.   Psychiatric:         Mood and Affect: Mood normal.       Significant Labs: All pertinent labs within the past 24 hours have been reviewed.  BMP:   Recent Labs   Lab 04/29/23  0244   *      K 3.7   CL 99   CO2 32   BUN 11   CREATININE 0.89   CALCIUM 9.2     CBC:   Recent Labs   Lab 04/28/23  0800 04/29/23  0244   WBC 6.26 6.73   HGB 13.0* 13.9   HCT 38.7* 40.9   * 451*       Significant Imaging: I have reviewed all pertinent imaging results/findings within the past 24 hours.

## 2023-04-29 NOTE — PROGRESS NOTES
Trough below desired range. Value of 4.3.     Will restart Vancomycin 1500 mg IV q 12 hrs.    Pharmacy to continue to follow daily.     Thank you.

## 2023-04-29 NOTE — PROGRESS NOTES
C/o pain    Cellulitis improved; wound granulating well    Continue abx  Plan home with iv abx, in another day or two

## 2023-04-29 NOTE — ASSESSMENT & PLAN NOTE
Patient's FSGs are uncontrolled due to hyperglycemia on current medication regimen.  Last A1c reviewed-   Lab Results   Component Value Date    HGBA1C 11.7 (H) 04/26/2023     Most recent fingerstick glucose reviewed- No results for input(s): POCTGLUCOSE in the last 24 hours.  Current correctional scale  Medium  Maintain anti-hyperglycemic dose as follows-   Antihyperglycemics (From admission, onward)    Start     Stop Route Frequency Ordered    04/29/23 2100  insulin detemir U-100 injection 25 Units         -- SubQ Nightly 04/29/23 0708    04/29/23 0715  insulin aspart U-100 injection 5 Units         -- SubQ 3 times daily with meals 04/29/23 0626    04/26/23 1455  insulin aspart U-100 injection 1-10 Units         -- SubQ Every 6 hours PRN 04/26/23 1355        Hold Oral hypoglycemics while patient is in the hospital.    Patient has not been complaint with medication and diet  Surgery Consulted thank them for their assistance  Wound Care consulted thank them for their assistance  Wound Culture and Blood cultures pending  ESR, CRP pending  Xray of Right foot pending  Vancomycin pharmacy to Dose  Zosyn 4.45 Q8hr will monitor cultures    4/27/23: surgical debridement of ulcer done today    4/28/23: bone culture/ wound culture GBS: awaiting sensitivity will consider discharge soon    4/28/23: changed vancomycin to Rocephin. Will consider outpatient IV antibiotic on discharge. .

## 2023-04-30 LAB
ANION GAP SERPL CALCULATED.3IONS-SCNC: 9 MMOL/L (ref 7–16)
BASOPHILS # BLD AUTO: 0.08 K/UL (ref 0–0.2)
BASOPHILS NFR BLD AUTO: 1 % (ref 0–1)
BUN SERPL-MCNC: 16 MG/DL (ref 7–18)
BUN/CREAT SERPL: 17 (ref 6–20)
CALCIUM SERPL-MCNC: 9.5 MG/DL (ref 8.5–10.1)
CHLORIDE SERPL-SCNC: 100 MMOL/L (ref 98–107)
CO2 SERPL-SCNC: 32 MMOL/L (ref 21–32)
CREAT SERPL-MCNC: 0.94 MG/DL (ref 0.7–1.3)
CULTURE, BONE: ABNORMAL
CULTURE, BONE: ABNORMAL
DIFFERENTIAL METHOD BLD: ABNORMAL
EGFR (NO RACE VARIABLE) (RUSH/TITUS): 97 ML/MIN/1.73M2
EOSINOPHIL # BLD AUTO: 0.24 K/UL (ref 0–0.5)
EOSINOPHIL NFR BLD AUTO: 2.9 % (ref 1–4)
ERYTHROCYTE [DISTWIDTH] IN BLOOD BY AUTOMATED COUNT: 12.4 % (ref 11.5–14.5)
GLUCOSE SERPL-MCNC: 222 MG/DL (ref 70–105)
GLUCOSE SERPL-MCNC: 232 MG/DL (ref 70–105)
GLUCOSE SERPL-MCNC: 242 MG/DL (ref 74–106)
GLUCOSE SERPL-MCNC: 245 MG/DL (ref 70–105)
GLUCOSE SERPL-MCNC: 295 MG/DL (ref 70–105)
HCT VFR BLD AUTO: 41.9 % (ref 40–54)
HGB BLD-MCNC: 13.8 G/DL (ref 13.5–18)
IMM GRANULOCYTES # BLD AUTO: 0.03 K/UL (ref 0–0.04)
IMM GRANULOCYTES NFR BLD: 0.4 % (ref 0–0.4)
LYMPHOCYTES # BLD AUTO: 2.53 K/UL (ref 1–4.8)
LYMPHOCYTES NFR BLD AUTO: 30.7 % (ref 27–41)
MCH RBC QN AUTO: 30.3 PG (ref 27–31)
MCHC RBC AUTO-ENTMCNC: 32.9 G/DL (ref 32–36)
MCV RBC AUTO: 92.1 FL (ref 80–96)
MONOCYTES # BLD AUTO: 0.61 K/UL (ref 0–0.8)
MONOCYTES NFR BLD AUTO: 7.4 % (ref 2–6)
MPC BLD CALC-MCNC: 9.8 FL (ref 9.4–12.4)
NEUTROPHILS # BLD AUTO: 4.76 K/UL (ref 1.8–7.7)
NEUTROPHILS NFR BLD AUTO: 57.6 % (ref 53–65)
NRBC # BLD AUTO: 0 X10E3/UL
NRBC, AUTO (.00): 0 %
PLATELET # BLD AUTO: 457 K/UL (ref 150–400)
POTASSIUM SERPL-SCNC: 3.7 MMOL/L (ref 3.5–5.1)
RBC # BLD AUTO: 4.55 M/UL (ref 4.6–6.2)
SODIUM SERPL-SCNC: 137 MMOL/L (ref 136–145)
WBC # BLD AUTO: 8.25 K/UL (ref 4.5–11)

## 2023-04-30 PROCEDURE — 94761 N-INVAS EAR/PLS OXIMETRY MLT: CPT

## 2023-04-30 PROCEDURE — 63600175 PHARM REV CODE 636 W HCPCS

## 2023-04-30 PROCEDURE — 99232 SBSQ HOSP IP/OBS MODERATE 35: CPT | Mod: GC,,, | Performed by: FAMILY MEDICINE

## 2023-04-30 PROCEDURE — 25000003 PHARM REV CODE 250

## 2023-04-30 PROCEDURE — 99232 PR SUBSEQUENT HOSPITAL CARE,LEVL II: ICD-10-PCS | Mod: GC,,, | Performed by: FAMILY MEDICINE

## 2023-04-30 PROCEDURE — 25000003 PHARM REV CODE 250: Performed by: SURGERY

## 2023-04-30 PROCEDURE — 11000001 HC ACUTE MED/SURG PRIVATE ROOM

## 2023-04-30 PROCEDURE — 85025 COMPLETE CBC W/AUTO DIFF WBC: CPT

## 2023-04-30 PROCEDURE — 80048 BASIC METABOLIC PNL TOTAL CA: CPT

## 2023-04-30 PROCEDURE — 82962 GLUCOSE BLOOD TEST: CPT

## 2023-04-30 PROCEDURE — 63600175 PHARM REV CODE 636 W HCPCS: Performed by: FAMILY MEDICINE

## 2023-04-30 RX ORDER — INSULIN ASPART 100 [IU]/ML
7 INJECTION, SOLUTION INTRAVENOUS; SUBCUTANEOUS
Status: DISCONTINUED | OUTPATIENT
Start: 2023-04-30 | End: 2023-05-02 | Stop reason: HOSPADM

## 2023-04-30 RX ADMIN — INSULIN DETEMIR 25 UNITS: 100 INJECTION, SOLUTION SUBCUTANEOUS at 09:04

## 2023-04-30 RX ADMIN — INSULIN ASPART 5 UNITS: 100 INJECTION, SOLUTION INTRAVENOUS; SUBCUTANEOUS at 06:04

## 2023-04-30 RX ADMIN — INSULIN ASPART 7 UNITS: 100 INJECTION, SOLUTION INTRAVENOUS; SUBCUTANEOUS at 12:04

## 2023-04-30 RX ADMIN — ASPIRIN 81 MG: 81 TABLET, COATED ORAL at 09:04

## 2023-04-30 RX ADMIN — CEFTRIAXONE SODIUM 2 G: 2 INJECTION, POWDER, FOR SOLUTION INTRAMUSCULAR; INTRAVENOUS at 12:04

## 2023-04-30 RX ADMIN — PRAVASTATIN SODIUM 20 MG: 10 TABLET ORAL at 09:04

## 2023-04-30 RX ADMIN — HYDROCHLOROTHIAZIDE 25 MG: 25 TABLET ORAL at 09:04

## 2023-04-30 RX ADMIN — INSULIN ASPART 7 UNITS: 100 INJECTION, SOLUTION INTRAVENOUS; SUBCUTANEOUS at 05:04

## 2023-04-30 NOTE — SUBJECTIVE & OBJECTIVE
Interval History: Pt evaluated at bedside today. He is resting comfortably in bed in no acute distress. Pt underwent debridement of the R great toe on 4/27. Pt reports no complaints. He denies pain as well as fever, chills, palpitations, diaphoresis, abd pain and n/v/d.     BG remains somewhat uncontrolled in the mid 200s. His prandial insulin was increased by 2 units at each dose. Will monitor glucose and potentially d/c him on long acting insulin BID.     Review of Systems  Objective:     Vital Signs (Most Recent):  Temp: 98.3 °F (36.8 °C) (04/30/23 0900)  Pulse: 90 (04/30/23 0900)  Resp: 16 (04/30/23 0900)  BP: 119/77 (04/30/23 0900)  SpO2: 97 % (04/30/23 0900) Vital Signs (24h Range):  Temp:  [97.7 °F (36.5 °C)-98.3 °F (36.8 °C)] 98.3 °F (36.8 °C)  Pulse:  [78-92] 90  Resp:  [16-18] 16  SpO2:  [94 %-97 %] 97 %  BP: (100-126)/(71-85) 119/77     Weight: 82.6 kg (182 lb)  Body mass index is 28.51 kg/m².  No intake or output data in the 24 hours ending 04/30/23 1523   Physical Exam  Constitutional:       Appearance: Normal appearance. He is normal weight.   HENT:      Head: Normocephalic and atraumatic.      Right Ear: Tympanic membrane normal.      Left Ear: Tympanic membrane normal.      Nose: Nose normal.      Mouth/Throat:      Mouth: Mucous membranes are moist.      Pharynx: Oropharynx is clear.   Eyes:      Conjunctiva/sclera: Conjunctivae normal.      Pupils: Pupils are equal, round, and reactive to light.   Cardiovascular:      Rate and Rhythm: Normal rate and regular rhythm.      Pulses: Normal pulses.      Heart sounds: Normal heart sounds.   Pulmonary:      Effort: Pulmonary effort is normal.      Breath sounds: Normal breath sounds.   Abdominal:      General: Abdomen is flat. Bowel sounds are normal.   Musculoskeletal:         General: No deformity. Normal range of motion.      Cervical back: Normal range of motion.      Comments: Right great toe with dressing in place. Dressing is stained but otherwise  intact.    Skin:     General: Skin is warm and dry.      Capillary Refill: Capillary refill takes less than 2 seconds.   Neurological:      Mental Status: He is alert and oriented to person, place, and time.   Psychiatric:         Mood and Affect: Mood normal.       Significant Labs: All pertinent labs within the past 24 hours have been reviewed.    Significant Imaging: I have reviewed all pertinent imaging results/findings within the past 24 hours.

## 2023-04-30 NOTE — ASSESSMENT & PLAN NOTE
Antihyperglycemics (From admission, onward)    Start     Stop Route Frequency Ordered    04/30/23 1130  insulin aspart U-100 injection 7 Units         -- SubQ 3 times daily with meals 04/30/23 0709    04/29/23 2100  insulin detemir U-100 injection 25 Units         -- SubQ Nightly 04/29/23 0708    04/26/23 1455  insulin aspart U-100 injection 1-10 Units         -- SubQ Every 6 hours PRN 04/26/23 1355        Hold Oral hypoglycemics while patient is in the hospital.

## 2023-04-30 NOTE — PROGRESS NOTES
"[facility]  [facility] Medicine  Progress Note    Patient Name: James Bolaños Jr.  MRN: [id number]  Patient Class: IP- Inpatient   Admission Date: 4/26/2023  Length of Stay: 4 days  Attending Physician: Edith Gatica DO  Primary Care Provider: [facility]        Subjective:     Principal Problem:Diabetic foot ulcer associated with type 2 diabetes mellitus        HPI:  Patient is a 51 yo M with a PMH of HTN and T2DM presenting to [facility] via EMS from [facility] for right big toe ulcer and swelling. Pt reports that the swelling and ulcer have been bothering him for about 1 week. He states he thought it was just a blister from wearing his boots, but he took his boot off a few days ago and states his foot "didn't feel right". Pt denies any pain but does state it throbs. Pt also reports redness to his big toe and foot. Pt reports polyuria, but denies fever, chills, chest pain, SOB, abdominal pain.N/V, constipation, diarrhea, dysuria.    The patient had the second toe on his right foot amputated in October 2021 with Dr. Tan. Pt is supposed to take aspirin, pravastatin, metformin, and insulin but he admits he has not being consistently compliant with his medications. Pt also notes he does not check his blood sugars at home.    In the ED, initial presenting vitals were /86, HR 86, RR 16, T 98 °F, Sp)2 98% on RA. Labs demonstrated: WBC 7.9, Hgb 12.7, Hct 37.7, Plts 383, Na 135, K 3.6, BUN 10, Cr 0.81, Glucose 249, , Alb 2.9, Lactic Acid 0.8, AST 8, ALT 15. The patient was given IV NS 1L bolus x1, Zosyn 4.5g IV x1 and Vancomycin 1,500 mg IV x1.     The patient was admitted to the [facility] family medicine service under the direct supervision of Dr. En DO for the continued care and medical management of this patient.      Overview/Hospital Course:  4/27/23: No overnight events. Pt underwent surgical debridement of R toe this AM with Dr. Tan. Wound and " bone cultures pending.  Continue IV antibiotics. Blood cultures pending. Wound care on board for daily care.     4/28/23: No acute overnight events. Patient did well with PT/OT. Pt has urinated since surgery but denies having a BM yet. Wound and bone cultures showed heavy growth Streptococcus agalactiae (Group B). Blood cultures negative at 24 hours. Continue IV antibiotics (Day 3: 4/26 - ). Wound care on board.       Interval History: Pt evaluated at bedside today. He is resting comfortably in bed in no acute distress. Pt underwent debridement of the R great toe on 4/27. Pt reports no complaints. He denies pain as well as fever, chills, palpitations, diaphoresis, abd pain and n/v/d.     BG remains somewhat uncontrolled in the mid 200s. His prandial insulin was increased by 2 units at each dose. Will monitor glucose and potentially d/c him on long acting insulin BID.     Review of Systems  Objective:     Vital Signs (Most Recent):  Temp: 98.3 °F (36.8 °C) (04/30/23 0900)  Pulse: 90 (04/30/23 0900)  Resp: 16 (04/30/23 0900)  BP: 119/77 (04/30/23 0900)  SpO2: 97 % (04/30/23 0900) Vital Signs (24h Range):  Temp:  [97.7 °F (36.5 °C)-98.3 °F (36.8 °C)] 98.3 °F (36.8 °C)  Pulse:  [78-92] 90  Resp:  [16-18] 16  SpO2:  [94 %-97 %] 97 %  BP: (100-126)/(71-85) 119/77     Weight: 82.6 kg (182 lb)  Body mass index is 28.51 kg/m².  No intake or output data in the 24 hours ending 04/30/23 1523   Physical Exam  Constitutional:       Appearance: Normal appearance. He is normal weight.   HENT:      Head: Normocephalic and atraumatic.      Right Ear: Tympanic membrane normal.      Left Ear: Tympanic membrane normal.      Nose: Nose normal.      Mouth/Throat:      Mouth: Mucous membranes are moist.      Pharynx: Oropharynx is clear.   Eyes:      Conjunctiva/sclera: Conjunctivae normal.      Pupils: Pupils are equal, round, and reactive to light.   Cardiovascular:      Rate and Rhythm: Normal rate and regular rhythm.      Pulses:  Normal pulses.      Heart sounds: Normal heart sounds.   Pulmonary:      Effort: Pulmonary effort is normal.      Breath sounds: Normal breath sounds.   Abdominal:      General: Abdomen is flat. Bowel sounds are normal.   Musculoskeletal:         General: No deformity. Normal range of motion.      Cervical back: Normal range of motion.      Comments: Right great toe with dressing in place. Dressing is stained but otherwise intact.    Skin:     General: Skin is warm and dry.      Capillary Refill: Capillary refill takes less than 2 seconds.   Neurological:      Mental Status: He is alert and oriented to person, place, and time.   Psychiatric:         Mood and Affect: Mood normal.       Significant Labs: All pertinent labs within the past 24 hours have been reviewed.    Significant Imaging: I have reviewed all pertinent imaging results/findings within the past 24 hours.      Assessment/Plan:      * Diabetic foot ulcer associated with type 2 diabetes mellitus    General Surgery and wound care following, assistance appreciated   Pt underwent debridement on 4/27   IV rocephin       Hyperlipidemia  Pravastatin 20 mg        Hypertension  HCTZ 25 mg daily  Hydralazine 10 mg IVP PRN        Diabetes mellitus    Antihyperglycemics (From admission, onward)    Start     Stop Route Frequency Ordered    04/30/23 1130  insulin aspart U-100 injection 7 Units         -- SubQ 3 times daily with meals 04/30/23 0709    04/29/23 2100  insulin detemir U-100 injection 25 Units         -- SubQ Nightly 04/29/23 0708    04/26/23 1455  insulin aspart U-100 injection 1-10 Units         -- SubQ Every 6 hours PRN 04/26/23 1355        Hold Oral hypoglycemics while patient is in the hospital.      VTE Risk Mitigation (From admission, onward)         Ordered     IP VTE LOW RISK PATIENT  Once         04/26/23 1346     Place sequential compression device  Until discontinued         04/26/23 1346                Discharge Planning   MÓNICA: 4/29/2023      Code Status: Full Code   Is the patient medically ready for discharge?:     Reason for patient still in hospital (select all that apply): Treatment  Discharge Plan A: Home with family                  Willian Ho DO  Department of Hospital Medicine   [facility]

## 2023-05-01 LAB
ANION GAP SERPL CALCULATED.3IONS-SCNC: 13 MMOL/L (ref 7–16)
BASOPHILS # BLD AUTO: 0.12 K/UL (ref 0–0.2)
BASOPHILS NFR BLD AUTO: 1.6 % (ref 0–1)
BUN SERPL-MCNC: 17 MG/DL (ref 7–18)
BUN/CREAT SERPL: 20 (ref 6–20)
CALCIUM SERPL-MCNC: 9.4 MG/DL (ref 8.5–10.1)
CHLORIDE SERPL-SCNC: 100 MMOL/L (ref 98–107)
CO2 SERPL-SCNC: 28 MMOL/L (ref 21–32)
CREAT SERPL-MCNC: 0.83 MG/DL (ref 0.7–1.3)
DIFFERENTIAL METHOD BLD: ABNORMAL
EGFR (NO RACE VARIABLE) (RUSH/TITUS): 105 ML/MIN/1.73M2
EOSINOPHIL # BLD AUTO: 0.26 K/UL (ref 0–0.5)
EOSINOPHIL NFR BLD AUTO: 3.6 % (ref 1–4)
ERYTHROCYTE [DISTWIDTH] IN BLOOD BY AUTOMATED COUNT: 12.3 % (ref 11.5–14.5)
GLUCOSE SERPL-MCNC: 167 MG/DL (ref 70–105)
GLUCOSE SERPL-MCNC: 186 MG/DL (ref 70–105)
GLUCOSE SERPL-MCNC: 208 MG/DL (ref 74–106)
GLUCOSE SERPL-MCNC: 213 MG/DL (ref 70–105)
HCT VFR BLD AUTO: 41.4 % (ref 40–54)
HGB BLD-MCNC: 13.8 G/DL (ref 13.5–18)
IMM GRANULOCYTES # BLD AUTO: 0.02 K/UL (ref 0–0.04)
IMM GRANULOCYTES NFR BLD: 0.3 % (ref 0–0.4)
LYMPHOCYTES # BLD AUTO: 2.21 K/UL (ref 1–4.8)
LYMPHOCYTES NFR BLD AUTO: 30.4 % (ref 27–41)
MCH RBC QN AUTO: 30.6 PG (ref 27–31)
MCHC RBC AUTO-ENTMCNC: 33.3 G/DL (ref 32–36)
MCV RBC AUTO: 91.8 FL (ref 80–96)
MICROORGANISM SPEC CULT: ABNORMAL
MICROORGANISM SPEC CULT: ABNORMAL
MONOCYTES # BLD AUTO: 0.55 K/UL (ref 0–0.8)
MONOCYTES NFR BLD AUTO: 7.6 % (ref 2–6)
MPC BLD CALC-MCNC: 10 FL (ref 9.4–12.4)
NEUTROPHILS # BLD AUTO: 4.12 K/UL (ref 1.8–7.7)
NEUTROPHILS NFR BLD AUTO: 56.5 % (ref 53–65)
NRBC # BLD AUTO: 0 X10E3/UL
NRBC, AUTO (.00): 0 %
PLATELET # BLD AUTO: 445 K/UL (ref 150–400)
POTASSIUM SERPL-SCNC: 4.3 MMOL/L (ref 3.5–5.1)
RBC # BLD AUTO: 4.51 M/UL (ref 4.6–6.2)
SODIUM SERPL-SCNC: 137 MMOL/L (ref 136–145)
WBC # BLD AUTO: 7.28 K/UL (ref 4.5–11)

## 2023-05-01 PROCEDURE — 85025 COMPLETE CBC W/AUTO DIFF WBC: CPT

## 2023-05-01 PROCEDURE — 25000003 PHARM REV CODE 250: Performed by: FAMILY MEDICINE

## 2023-05-01 PROCEDURE — 25000003 PHARM REV CODE 250: Performed by: SURGERY

## 2023-05-01 PROCEDURE — 80048 BASIC METABOLIC PNL TOTAL CA: CPT

## 2023-05-01 PROCEDURE — 63600175 PHARM REV CODE 636 W HCPCS: Performed by: SURGERY

## 2023-05-01 PROCEDURE — 99232 SBSQ HOSP IP/OBS MODERATE 35: CPT | Mod: GC,,, | Performed by: FAMILY MEDICINE

## 2023-05-01 PROCEDURE — 99232 PR SUBSEQUENT HOSPITAL CARE,LEVL II: ICD-10-PCS | Mod: GC,,, | Performed by: FAMILY MEDICINE

## 2023-05-01 PROCEDURE — 11000001 HC ACUTE MED/SURG PRIVATE ROOM

## 2023-05-01 PROCEDURE — 82962 GLUCOSE BLOOD TEST: CPT

## 2023-05-01 PROCEDURE — 25000003 PHARM REV CODE 250

## 2023-05-01 PROCEDURE — 63600175 PHARM REV CODE 636 W HCPCS

## 2023-05-01 PROCEDURE — 63600175 PHARM REV CODE 636 W HCPCS: Performed by: FAMILY MEDICINE

## 2023-05-01 RX ORDER — MUPIROCIN 20 MG/G
OINTMENT TOPICAL 2 TIMES DAILY
Status: DISCONTINUED | OUTPATIENT
Start: 2023-05-01 | End: 2023-05-02 | Stop reason: HOSPADM

## 2023-05-01 RX ADMIN — INSULIN ASPART 7 UNITS: 100 INJECTION, SOLUTION INTRAVENOUS; SUBCUTANEOUS at 08:05

## 2023-05-01 RX ADMIN — MORPHINE SULFATE 4 MG: 4 INJECTION, SOLUTION INTRAMUSCULAR; INTRAVENOUS at 10:05

## 2023-05-01 RX ADMIN — PRAVASTATIN SODIUM 20 MG: 10 TABLET ORAL at 09:05

## 2023-05-01 RX ADMIN — ASPIRIN 81 MG: 81 TABLET, COATED ORAL at 08:05

## 2023-05-01 RX ADMIN — AMPICILLIN SODIUM 2 G: 2 INJECTION, POWDER, FOR SOLUTION INTRAVENOUS at 05:05

## 2023-05-01 RX ADMIN — INSULIN ASPART 7 UNITS: 100 INJECTION, SOLUTION INTRAVENOUS; SUBCUTANEOUS at 11:05

## 2023-05-01 RX ADMIN — MUPIROCIN: 20 OINTMENT TOPICAL at 10:05

## 2023-05-01 RX ADMIN — INSULIN ASPART 7 UNITS: 100 INJECTION, SOLUTION INTRAVENOUS; SUBCUTANEOUS at 05:05

## 2023-05-01 RX ADMIN — INSULIN ASPART 2 UNITS: 100 INJECTION, SOLUTION INTRAVENOUS; SUBCUTANEOUS at 05:05

## 2023-05-01 RX ADMIN — INSULIN DETEMIR 30 UNITS: 100 INJECTION, SOLUTION SUBCUTANEOUS at 10:05

## 2023-05-01 RX ADMIN — HYDROCHLOROTHIAZIDE 25 MG: 25 TABLET ORAL at 08:05

## 2023-05-01 RX ADMIN — CEFTRIAXONE SODIUM 2 G: 2 INJECTION, POWDER, FOR SOLUTION INTRAMUSCULAR; INTRAVENOUS at 11:05

## 2023-05-01 RX ADMIN — AMPICILLIN SODIUM 2 G: 2 INJECTION, POWDER, FOR SOLUTION INTRAVENOUS at 09:05

## 2023-05-01 RX ADMIN — AMPICILLIN SODIUM 2 G: 2 INJECTION, POWDER, FOR SOLUTION INTRAVENOUS at 01:05

## 2023-05-01 NOTE — PLAN OF CARE
Problem: Adult Inpatient Plan of Care  Goal: Plan of Care Review  Outcome: Ongoing, Progressing  Flowsheets (Taken 5/1/2023 1646)  Plan of Care Reviewed With: patient  Goal: Patient-Specific Goal (Individualized)  Outcome: Ongoing, Progressing  Flowsheets (Taken 5/1/2023 1646)  Anxieties, Fears or Concerns: NOne  Individualized Care Needs: IV antibiotics, PICC line  Goal: Absence of Hospital-Acquired Illness or Injury  Outcome: Ongoing, Progressing  Intervention: Prevent Skin Injury  Flowsheets (Taken 5/1/2023 1646)  Skin Protection:   adhesive use limited   incontinence pads utilized  Intervention: Prevent and Manage VTE (Venous Thromboembolism) Risk  Flowsheets (Taken 5/1/2023 1646)  Range of Motion: active ROM (range of motion) encouraged  Intervention: Prevent Infection  Flowsheets (Taken 5/1/2023 1646)  Infection Prevention:   rest/sleep promoted   single patient room provided   hand hygiene promoted  Goal: Optimal Comfort and Wellbeing  Outcome: Ongoing, Progressing  Intervention: Monitor Pain and Promote Comfort  Flowsheets (Taken 5/1/2023 1646)  Pain Management Interventions:   pain management plan reviewed with patient/caregiver   quiet environment facilitated   relaxation techniques promoted  Intervention: Provide Person-Centered Care  Flowsheets (Taken 5/1/2023 1646)  Trust Relationship/Rapport:   care explained   choices provided   emotional support provided   empathic listening provided   thoughts/feelings acknowledged   reassurance provided   questions answered   questions encouraged  Goal: Readiness for Transition of Care  Outcome: Ongoing, Progressing     Problem: Diabetes Comorbidity  Goal: Blood Glucose Level Within Targeted Range  Outcome: Ongoing, Progressing  Intervention: Monitor and Manage Glycemia  Flowsheets (Taken 5/1/2023 1646)  Glycemic Management: blood glucose monitored     Problem: Infection  Goal: Absence of Infection Signs and Symptoms  Outcome: Ongoing, Progressing  Intervention:  Prevent or Manage Infection  Flowsheets (Taken 5/1/2023 1646)  Fever Reduction/Comfort Measures:   lightweight clothing   lightweight bedding  Infection Management: aseptic technique maintained  Isolation Precautions: precautions maintained     Problem: Fall Injury Risk  Goal: Absence of Fall and Fall-Related Injury  Outcome: Ongoing, Progressing  Intervention: Identify and Manage Contributors  Flowsheets (Taken 5/1/2023 1646)  Self-Care Promotion:   independence encouraged   BADL personal objects within reach  Medication Review/Management: medications reviewed   Plan of care reviewed. Patient progressing

## 2023-05-01 NOTE — SUBJECTIVE & OBJECTIVE
Interval History: Patient evaluated at bedside this AM. Resting comfortably in bed, in no distress. Doing well today no complaints. Denies fever, chills, chest pain, SOB, swelling. He does have some throbbing pain in foot but is controlled with pain medication. Continue IV Rocephin. Patient to get PICC line, continue abx for 6 weeks (Day 6: 4/26-6/7/23). SW on board and will help determine outpt vs LTAC/swingbed. Continue daily wound care.    POC Glucose today 186. Continue insulin and will monitor.     Review of Systems   Constitutional:  Negative for fatigue and fever.   HENT:  Negative for ear discharge, ear pain, facial swelling, sinus pressure, sinus pain and sneezing.    Eyes:  Negative for pain, discharge and itching.   Respiratory:  Negative for cough and shortness of breath.    Cardiovascular: Negative.    Gastrointestinal:  Negative for abdominal distention, abdominal pain, anal bleeding, constipation, diarrhea and nausea.   Endocrine: Negative.    Genitourinary:  Negative for difficulty urinating, flank pain and frequency.   Musculoskeletal:  Positive for arthralgias (toe pain).   Skin:  Positive for wound.   Allergic/Immunologic: Negative.    Neurological:  Negative for weakness, light-headedness and headaches.   Psychiatric/Behavioral: Negative.     Objective:     Vital Signs (Most Recent):  Temp: 98 °F (36.7 °C) (05/01/23 0721)  Pulse: 77 (05/01/23 0721)  Resp: 18 (05/01/23 0721)  BP: 116/78 (05/01/23 0721)  SpO2: (!) 93 % (05/01/23 0721)   Vital Signs (24h Range):  Temp:  [97.2 °F (36.2 °C)-98.3 °F (36.8 °C)] 98 °F (36.7 °C)  Pulse:  [77-92] 77  Resp:  [16-19] 18  SpO2:  [93 %-98 %] 93 %  BP: (110-119)/(71-78) 116/78     Weight: 82.6 kg (182 lb)  Body mass index is 28.51 kg/m².  No intake or output data in the 24 hours ending 05/01/23 0757   Physical Exam  Constitutional:       Appearance: Normal appearance. He is normal weight.   HENT:      Head: Normocephalic and atraumatic.      Right Ear: Tympanic  membrane normal.      Left Ear: Tympanic membrane normal.      Nose: Nose normal.      Mouth/Throat:      Mouth: Mucous membranes are moist.      Pharynx: Oropharynx is clear.   Eyes:      Conjunctiva/sclera: Conjunctivae normal.      Pupils: Pupils are equal, round, and reactive to light.   Cardiovascular:      Rate and Rhythm: Normal rate and regular rhythm.      Pulses: Normal pulses.      Heart sounds: Normal heart sounds.   Pulmonary:      Effort: Pulmonary effort is normal.      Breath sounds: Normal breath sounds.   Abdominal:      General: Abdomen is flat. Bowel sounds are normal.   Musculoskeletal:         General: No deformity. Normal range of motion.      Cervical back: Normal range of motion.      Comments: Right great toe with dressing in place. Dressing is stained but otherwise intact.    Skin:     General: Skin is warm and dry.      Capillary Refill: Capillary refill takes less than 2 seconds.   Neurological:      Mental Status: He is alert and oriented to person, place, and time.   Psychiatric:         Mood and Affect: Mood normal.       Significant Labs: All pertinent labs within the past 24 hours have been reviewed.    Significant Imaging: I have reviewed all pertinent imaging results/findings within the past 24 hours.

## 2023-05-01 NOTE — PLAN OF CARE
[facility]  Discharge Reassessment    Primary Care Provider: [facility]    Expected Discharge Date: 4/29/2023    Reassessment (most recent)       Discharge Reassessment - 05/01/23 1535          Discharge Reassessment    Assessment Type Discharge Planning Reassessment     Discharge Plan discussed with: Patient     Discharge Plan A Home Health;Home with family   [facility]    Discharge Plan B Home Health;Home with family     DME Needed Upon Discharge  none     Discharge Barriers Identified None     Why the patient remains in the hospital Requires continued medical care        Post-Acute Status    Post-Acute Authorization IV Infusion;Home Health     Home Health Status Referrals Sent     IV Infusion Status Referral(s) sent     Patient choice form signed by patient/caregiver List with quality metrics by geographic area provided;List from CMS Compare     Discharge Delays None known at this time                     MAURICIO consulted to arrange IV Daptomycin 6mg/kg a day until 6/7/2023 for osteomyelitis and a weekly CBC, BMP, and CPK level. MAURICIO spoke with pt and received choice for [facility]. MAURICIO faxed initial hh referral and faxed referral to [facility] for insurance approval for iv abx. MAURICIO updated bcbs plan in portal. SW following.

## 2023-05-01 NOTE — ASSESSMENT & PLAN NOTE
Antihyperglycemics (From admission, onward)    Start     Stop Route Frequency Ordered    05/01/23 2100  insulin detemir U-100 injection 30 Units         -- SubQ Nightly 05/01/23 1115    04/30/23 1130  insulin aspart U-100 injection 7 Units         -- SubQ 3 times daily with meals 04/30/23 0709    04/26/23 1455  insulin aspart U-100 injection 1-10 Units         -- SubQ Every 6 hours PRN 04/26/23 1355        Hold Oral hypoglycemics while patient is in the hospital.

## 2023-05-01 NOTE — PROGRESS NOTES
"[facility]  [facility] Medicine  Progress Note    Patient Name: James Bolaños Jr.  MRN: [id number]  Patient Class: IP- Inpatient   Admission Date: 4/26/2023  Length of Stay: 5 days  Attending Physician: Edith Gatica DO  Primary Care Provider: [facility]        Subjective:     Principal Problem:Diabetic foot ulcer associated with type 2 diabetes mellitus        HPI:  Patient is a 51 yo M with a PMH of HTN and T2DM presenting to [facility] via EMS from [facility] for right big toe ulcer and swelling. Pt reports that the swelling and ulcer have been bothering him for about 1 week. He states he thought it was just a blister from wearing his boots, but he took his boot off a few days ago and states his foot "didn't feel right". Pt denies any pain but does state it throbs. Pt also reports redness to his big toe and foot. Pt reports polyuria, but denies fever, chills, chest pain, SOB, abdominal pain.N/V, constipation, diarrhea, dysuria.    The patient had the second toe on his right foot amputated in October 2021 with Dr. Tan. Pt is supposed to take aspirin, pravastatin, metformin, and insulin but he admits he has not being consistently compliant with his medications. Pt also notes he does not check his blood sugars at home.    In the ED, initial presenting vitals were /86, HR 86, RR 16, T 98 °F, Sp)2 98% on RA. Labs demonstrated: WBC 7.9, Hgb 12.7, Hct 37.7, Plts 383, Na 135, K 3.6, BUN 10, Cr 0.81, Glucose 249, , Alb 2.9, Lactic Acid 0.8, AST 8, ALT 15. The patient was given IV NS 1L bolus x1, Zosyn 4.5g IV x1 and Vancomycin 1,500 mg IV x1.     The patient was admitted to the [facility] family medicine service under the direct supervision of Dr. En DO for the continued care and medical management of this patient.      Overview/Hospital Course:  4/27/23: No overnight events. Pt underwent surgical debridement of R toe this AM with Dr. Tan. Wound and " bone cultures pending.  Continue IV antibiotics. Blood cultures pending. Wound care on board for daily care.     4/28/23: No acute overnight events. Patient did well with PT/OT. Pt has urinated since surgery but denies having a BM yet. Wound and bone cultures showed heavy growth Streptococcus agalactiae (Group B). Blood cultures negative at 24 hours. Continue IV antibiotics (Day 3: 4/26 - ). Wound care on board.     4/29/23: No overnight events. Wound and bone culture growing GBS. Will change vancomycin to Rocephin. Will get home health to [facility] with set up for outpatient IV treatment. Ordered a PICC line and get home health to also do wound care when patient is discharged.    4/30/23: No overnight events. Pt underwent debridement of the R great toe on 4/27. Pt reports no complaints. He denies pain as well as fever, chills, palpitations, diaphoresis, abd pain and n/v/d. BG remains somewhat uncontrolled in the mid 200s. His prandial insulin was increased by 2 units at each dose. Will monitor glucose and potentially d/c him on long acting insulin BID.     5/1/23: No overnight events. Denies fever, chills, chest pain, SOB, swelling. He does have some throbbing pain in foot but is controlled with pain medication. Continue IV Rocephin. Patient to get PICC line, continue abx for 6 weeks (Day 6: 4/26-6/7/23). SW on board and will help determine outpt vs LTAC/swingbed. Continue daily wound care. POC Glucose today 186. Continue insulin and will monitor.       Interval History: Patient evaluated at bedside this AM. Resting comfortably in bed, in no distress. Doing well today no complaints. Denies fever, chills, chest pain, SOB, swelling. He does have some throbbing pain in foot but is controlled with pain medication. Continue IV Rocephin. Patient to get PICC line, continue abx for 6 weeks (Day 6: 4/26-6/7/23). SW on board and will help determine outpt vs LTAC/swingbed. Continue daily wound care.    POC Glucose today 186.  Continue insulin and will monitor.     Review of Systems   Constitutional:  Negative for fatigue and fever.   HENT:  Negative for ear discharge, ear pain, facial swelling, sinus pressure, sinus pain and sneezing.    Eyes:  Negative for pain, discharge and itching.   Respiratory:  Negative for cough and shortness of breath.    Cardiovascular: Negative.    Gastrointestinal:  Negative for abdominal distention, abdominal pain, anal bleeding, constipation, diarrhea and nausea.   Endocrine: Negative.    Genitourinary:  Negative for difficulty urinating, flank pain and frequency.   Musculoskeletal:  Positive for arthralgias (toe pain).   Skin:  Positive for wound.   Allergic/Immunologic: Negative.    Neurological:  Negative for weakness, light-headedness and headaches.   Psychiatric/Behavioral: Negative.     Objective:     Vital Signs (Most Recent):  Temp: 98 °F (36.7 °C) (05/01/23 0721)  Pulse: 77 (05/01/23 0721)  Resp: 18 (05/01/23 0721)  BP: 116/78 (05/01/23 0721)  SpO2: (!) 93 % (05/01/23 0721)   Vital Signs (24h Range):  Temp:  [97.2 °F (36.2 °C)-98.3 °F (36.8 °C)] 98 °F (36.7 °C)  Pulse:  [77-92] 77  Resp:  [16-19] 18  SpO2:  [93 %-98 %] 93 %  BP: (110-119)/(71-78) 116/78     Weight: 82.6 kg (182 lb)  Body mass index is 28.51 kg/m².  No intake or output data in the 24 hours ending 05/01/23 0757   Physical Exam  Constitutional:       Appearance: Normal appearance. He is normal weight.   HENT:      Head: Normocephalic and atraumatic.      Right Ear: Tympanic membrane normal.      Left Ear: Tympanic membrane normal.      Nose: Nose normal.      Mouth/Throat:      Mouth: Mucous membranes are moist.      Pharynx: Oropharynx is clear.   Eyes:      Conjunctiva/sclera: Conjunctivae normal.      Pupils: Pupils are equal, round, and reactive to light.   Cardiovascular:      Rate and Rhythm: Normal rate and regular rhythm.      Pulses: Normal pulses.      Heart sounds: Normal heart sounds.   Pulmonary:      Effort: Pulmonary  effort is normal.      Breath sounds: Normal breath sounds.   Abdominal:      General: Abdomen is flat. Bowel sounds are normal.   Musculoskeletal:         General: No deformity. Normal range of motion.      Cervical back: Normal range of motion.      Comments: Right great toe with dressing in place. Dressing is stained but otherwise intact.    Skin:     General: Skin is warm and dry.      Capillary Refill: Capillary refill takes less than 2 seconds.   Neurological:      Mental Status: He is alert and oriented to person, place, and time.   Psychiatric:         Mood and Affect: Mood normal.       Significant Labs: All pertinent labs within the past 24 hours have been reviewed.    Significant Imaging: I have reviewed all pertinent imaging results/findings within the past 24 hours.      Assessment/Plan:      * Diabetic foot ulcer associated with type 2 diabetes mellitus  Patient's FSGs are uncontrolled due to hyperglycemia on current medication regimen.  Last A1c reviewed-   Lab Results   Component Value Date    HGBA1C 11.7 (H) 04/26/2023     Most recent fingerstick glucose reviewed- No results for input(s): POCTGLUCOSE in the last 24 hours.  Current correctional scale  Medium  Maintain anti-hyperglycemic dose as follows-   Antihyperglycemics (From admission, onward)    Start     Stop Route Frequency Ordered    05/01/23 2100  insulin detemir U-100 injection 30 Units         -- SubQ Nightly 05/01/23 1115    04/30/23 1130  insulin aspart U-100 injection 7 Units         -- SubQ 3 times daily with meals 04/30/23 0709    04/26/23 1455  insulin aspart U-100 injection 1-10 Units         -- SubQ Every 6 hours PRN 04/26/23 1355        Hold Oral hypoglycemics while patient is in the hospital.    Patient has not been complaint with medication and diet  Surgery Consulted thank them for their assistance  Wound Care consulted thank them for their assistance  Wound Culture and Blood cultures pending  ESR, CRP pending  Xray of Right  foot pending  Vancomycin pharmacy to Dose  Zosyn 4.45 Q8hr will monitor cultures    4/27/23: surgical debridement of ulcer done today    4/28/23: bone culture/ wound culture GBS: awaiting sensitivity will consider discharge soon    4/28/23: changed vancomycin to Rocephin. Will consider outpatient IV antibiotic on discharge.     5/1/2023: Planning outpatient LTAC vs swingbed to continue antibiotic. Or home health Antibiotic with PICC line      Hypertension  HCTZ 25 mg daily  Hydralazine 10 mg IVP PRN        Hyperlipidemia  Pravastatin 20 mg        Gangrene of toe        Diabetes mellitus    Antihyperglycemics (From admission, onward)    Start     Stop Route Frequency Ordered    05/01/23 2100  insulin detemir U-100 injection 30 Units         -- SubQ Nightly 05/01/23 1115    04/30/23 1130  insulin aspart U-100 injection 7 Units         -- SubQ 3 times daily with meals 04/30/23 0709    04/26/23 1455  insulin aspart U-100 injection 1-10 Units         -- SubQ Every 6 hours PRN 04/26/23 1355        Hold Oral hypoglycemics while patient is in the hospital.      VTE Risk Mitigation (From admission, onward)         Ordered     IP VTE LOW RISK PATIENT  Once         04/26/23 1346     Place sequential compression device  Until discontinued         04/26/23 1346                Discharge Planning   MÓNICA: 4/29/2023     Code Status: Full Code   Is the patient medically ready for discharge?:     Reason for patient still in hospital (select all that apply): Treatment  Discharge Plan A: Home with family                  Aaron Magaña MD  Department of Hospital Medicine   [facility]

## 2023-05-01 NOTE — PROGRESS NOTES
Patient without complaints.  Afebrile.  Vital signs stable.  WBC 7000.  Dressing changed.  Unpacked and repacked with iodoform gauze.  Cause and Coban dressing reapplied.  Wound culture positive for group B strep and Enterococcus faecalis, sensitive to penicillin.    If able to arrange infusion, the patient could be discharged home with continued IV antibiotics outpatient.

## 2023-05-01 NOTE — ASSESSMENT & PLAN NOTE
Patient's FSGs are uncontrolled due to hyperglycemia on current medication regimen.  Last A1c reviewed-   Lab Results   Component Value Date    HGBA1C 11.7 (H) 04/26/2023     Most recent fingerstick glucose reviewed- No results for input(s): POCTGLUCOSE in the last 24 hours.  Current correctional scale  Medium  Maintain anti-hyperglycemic dose as follows-   Antihyperglycemics (From admission, onward)    Start     Stop Route Frequency Ordered    05/01/23 2100  insulin detemir U-100 injection 30 Units         -- SubQ Nightly 05/01/23 1115    04/30/23 1130  insulin aspart U-100 injection 7 Units         -- SubQ 3 times daily with meals 04/30/23 0709    04/26/23 1455  insulin aspart U-100 injection 1-10 Units         -- SubQ Every 6 hours PRN 04/26/23 1355        Hold Oral hypoglycemics while patient is in the hospital.    Patient has not been complaint with medication and diet  Surgery Consulted thank them for their assistance  Wound Care consulted thank them for their assistance  Wound Culture and Blood cultures pending  ESR, CRP pending  Xray of Right foot pending  Vancomycin pharmacy to Dose  Zosyn 4.45 Q8hr will monitor cultures    4/27/23: surgical debridement of ulcer done today    4/28/23: bone culture/ wound culture GBS: awaiting sensitivity will consider discharge soon    4/28/23: changed vancomycin to Rocephin. Will consider outpatient IV antibiotic on discharge.     5/1/2023: Planning outpatient LTAC vs swingbed to continue antibiotic. Or home health Antibiotic with PICC line

## 2023-05-01 NOTE — PROGRESS NOTES
PICC insertion  Performed by A Tiffany A  Consent obtained for PICC line insertion.  A formal timeout was called all staff present agreed to patient and procedure.  The left upper extremity was prepped with ChloraPrep and sterile field was established.  1% lidocaine was used as local anesthetic.  Under ultrasound guidance the basilic vein was localized and accessed with a micropuncture needle.  An 018 guidewire was passed through the vein to the level of the superior vena cava.  A 45 cm PICC line was then placed over the wire with its tip terminating at the atrial caval junction.  Wire was removed both ports were flushed with heparinized saline.  The PICC line was then cleaned and secured.  The patient tolerated the procedure well there were no immediate postprocedure complications.  Total fluoroscopy time was 12 seconds.

## 2023-05-02 VITALS
SYSTOLIC BLOOD PRESSURE: 123 MMHG | WEIGHT: 182 LBS | DIASTOLIC BLOOD PRESSURE: 76 MMHG | RESPIRATION RATE: 16 BRPM | OXYGEN SATURATION: 96 % | HEIGHT: 67 IN | HEART RATE: 86 BPM | TEMPERATURE: 98 F | BODY MASS INDEX: 28.56 KG/M2

## 2023-05-02 LAB
ANION GAP SERPL CALCULATED.3IONS-SCNC: 11 MMOL/L (ref 7–16)
BACTERIA BLD CULT: NORMAL
BACTERIA BLD CULT: NORMAL
BUN SERPL-MCNC: 16 MG/DL (ref 7–18)
BUN/CREAT SERPL: 18 (ref 6–20)
CALCIUM SERPL-MCNC: 9.2 MG/DL (ref 8.5–10.1)
CHLORIDE SERPL-SCNC: 102 MMOL/L (ref 98–107)
CO2 SERPL-SCNC: 30 MMOL/L (ref 21–32)
CREAT SERPL-MCNC: 0.89 MG/DL (ref 0.7–1.3)
EGFR (NO RACE VARIABLE) (RUSH/TITUS): 102 ML/MIN/1.73M2
GLUCOSE SERPL-MCNC: 146 MG/DL (ref 74–106)
GLUCOSE SERPL-MCNC: 229 MG/DL (ref 70–105)
GLUCOSE SERPL-MCNC: 229 MG/DL (ref 70–105)
GLUCOSE SERPL-MCNC: 235 MG/DL (ref 70–105)
POTASSIUM SERPL-SCNC: 3.8 MMOL/L (ref 3.5–5.1)
SODIUM SERPL-SCNC: 139 MMOL/L (ref 136–145)

## 2023-05-02 PROCEDURE — 25000003 PHARM REV CODE 250

## 2023-05-02 PROCEDURE — 63600175 PHARM REV CODE 636 W HCPCS: Performed by: SURGERY

## 2023-05-02 PROCEDURE — 63600175 PHARM REV CODE 636 W HCPCS

## 2023-05-02 PROCEDURE — 63600175 PHARM REV CODE 636 W HCPCS: Performed by: FAMILY MEDICINE

## 2023-05-02 PROCEDURE — 25000003 PHARM REV CODE 250: Performed by: SURGERY

## 2023-05-02 PROCEDURE — 99239 HOSP IP/OBS DSCHRG MGMT >30: CPT | Mod: GC,,, | Performed by: FAMILY MEDICINE

## 2023-05-02 PROCEDURE — 82962 GLUCOSE BLOOD TEST: CPT

## 2023-05-02 PROCEDURE — 80048 BASIC METABOLIC PNL TOTAL CA: CPT

## 2023-05-02 PROCEDURE — 99239 PR HOSPITAL DISCHARGE DAY,>30 MIN: ICD-10-PCS | Mod: GC,,, | Performed by: FAMILY MEDICINE

## 2023-05-02 RX ORDER — HYDROCODONE BITARTRATE AND ACETAMINOPHEN 7.5; 325 MG/1; MG/1
1 TABLET ORAL EVERY 6 HOURS PRN
Qty: 12 TABLET | Refills: 0 | Status: SHIPPED | OUTPATIENT
Start: 2023-05-02 | End: 2023-05-02 | Stop reason: SDUPTHER

## 2023-05-02 RX ORDER — HYDROCHLOROTHIAZIDE 25 MG/1
25 TABLET ORAL DAILY
Qty: 30 TABLET | Refills: 11 | Status: SHIPPED | OUTPATIENT
Start: 2023-05-03 | End: 2024-05-02

## 2023-05-02 RX ORDER — HYDROCODONE BITARTRATE AND ACETAMINOPHEN 7.5; 325 MG/1; MG/1
1 TABLET ORAL EVERY 6 HOURS PRN
Qty: 12 TABLET | Refills: 0 | Status: SHIPPED | OUTPATIENT
Start: 2023-05-02

## 2023-05-02 RX ADMIN — AMPICILLIN SODIUM 2 G: 2 INJECTION, POWDER, FOR SOLUTION INTRAVENOUS at 01:05

## 2023-05-02 RX ADMIN — INSULIN ASPART 7 UNITS: 100 INJECTION, SOLUTION INTRAVENOUS; SUBCUTANEOUS at 07:05

## 2023-05-02 RX ADMIN — DAPTOMYCIN 500 MG: 500 INJECTION, POWDER, LYOPHILIZED, FOR SOLUTION INTRAVENOUS at 12:05

## 2023-05-02 RX ADMIN — MUPIROCIN: 20 OINTMENT TOPICAL at 08:05

## 2023-05-02 RX ADMIN — AMPICILLIN SODIUM 2 G: 2 INJECTION, POWDER, FOR SOLUTION INTRAVENOUS at 08:05

## 2023-05-02 RX ADMIN — INSULIN ASPART 4 UNITS: 100 INJECTION, SOLUTION INTRAVENOUS; SUBCUTANEOUS at 11:05

## 2023-05-02 RX ADMIN — INSULIN ASPART 4 UNITS: 100 INJECTION, SOLUTION INTRAVENOUS; SUBCUTANEOUS at 07:05

## 2023-05-02 RX ADMIN — ASPIRIN 81 MG: 81 TABLET, COATED ORAL at 08:05

## 2023-05-02 RX ADMIN — INSULIN ASPART 7 UNITS: 100 INJECTION, SOLUTION INTRAVENOUS; SUBCUTANEOUS at 11:05

## 2023-05-02 RX ADMIN — AMPICILLIN SODIUM 2 G: 2 INJECTION, POWDER, FOR SOLUTION INTRAVENOUS at 05:05

## 2023-05-02 RX ADMIN — HYDROCHLOROTHIAZIDE 25 MG: 25 TABLET ORAL at 08:05

## 2023-05-02 NOTE — DISCHARGE SUMMARY
"[facility]  [facility] Medicine  Discharge Summary      Patient Name: James Bolaños Jr.  MRN: [id number]  JUVENTINO: [id number]  Patient Class: IP- Inpatient  Admission Date: 4/26/2023  Hospital Length of Stay: 6 days  Discharge Date and Time:  05/02/2023 12:47 PM  Attending Physician: Edith Gatica DO   Discharging Provider: Aaron Magaña MD  Primary Care Provider: [facility]    Primary Care Team: Networked reference to record PCT     HPI:   Patient is a 51 yo M with a PMH of HTN and T2DM presenting to [facility] via EMS from [facility] for right big toe ulcer and swelling. Pt reports that the swelling and ulcer have been bothering him for about 1 week. He states he thought it was just a blister from wearing his boots, but he took his boot off a few days ago and states his foot "didn't feel right". Pt denies any pain but does state it throbs. Pt also reports redness to his big toe and foot. Pt reports polyuria, but denies fever, chills, chest pain, SOB, abdominal pain.N/V, constipation, diarrhea, dysuria.    The patient had the second toe on his right foot amputated in October 2021 with Dr. Tan. Pt is supposed to take aspirin, pravastatin, metformin, and insulin but he admits he has not being consistently compliant with his medications. Pt also notes he does not check his blood sugars at home.    In the ED, initial presenting vitals were /86, HR 86, RR 16, T 98 °F, Sp)2 98% on RA. Labs demonstrated: WBC 7.9, Hgb 12.7, Hct 37.7, Plts 383, Na 135, K 3.6, BUN 10, Cr 0.81, Glucose 249, , Alb 2.9, Lactic Acid 0.8, AST 8, ALT 15. The patient was given IV NS 1L bolus x1, Zosyn 4.5g IV x1 and Vancomycin 1,500 mg IV x1.     The patient was admitted to the [facility] family medicine service under the direct supervision of Dr. En DO for the continued care and medical management of this patient.      Procedure(s) (LRB):  DEBRIDEMENT, PHALANX, FOOT (Right)  "     Hospital Course:   4/27/23: No overnight events. Pt underwent surgical debridement of R toe this AM with Dr. Tan. Wound and bone cultures pending.  Continue IV antibiotics. Blood cultures pending. Wound care on board for daily care. 4/28/23: No acute overnight events. Patient did well with PT/OT. Pt has urinated since surgery but denies having a BM yet. Wound and bone cultures showed heavy growth Streptococcus agalactiae (Group B). Blood cultures negative at 24 hours. Continue IV antibiotics (Day 3: 4/26 - ). Wound care on board. 4/29/23: No overnight events. Wound and bone culture growing GBS. Will change vancomycin to Rocephin. Will get home health to [facility] with set up for outpatient IV treatment. Ordered a PICC line and get home health to also do wound care when patient is discharged.  4/30/23: No overnight events. Pt underwent debridement of the R great toe on 4/27. Pt reports no complaints. He denies pain as well as fever, chills, palpitations, diaphoresis, abd pain and n/v/d. BG remains somewhat uncontrolled in the mid 200s. His prandial insulin was increased by 2 units at each dose. Will monitor glucose and potentially d/c him on long acting insulin BID. 5/1/23: No overnight events. Denies fever, chills, chest pain, SOB, swelling. He does have some throbbing pain in foot but is controlled with pain medication. Continue IV Rocephin. Patient to get PICC line, continue abx for 6 weeks (Day 6: 4/26-6/7/23). SW on board and will help determine outpt vs LTAC/swingbed. Continue daily wound care. POC Glucose today 186. Continue insulin and will monitor. Changed antibiotics from IV Ceftriaxone to IV Ampicillin (wound culture grew Enterococcus, and Strep is also susceptible to ampicillin). IV Ampicillin 2g every 4 hours in the hospital.Patient will be D/C with IV Daptomycin 6mg/kg QD via PICC line with . Social service consult placed. Patient will need weekly CBC, BMP, and CPK level. D/C Statin at discharge.  Patient has reached maximum benefit from this admission. Patient will continue his antibiotic with home health. Patient is to follow up with his PCP in 1 week for hospitial follow up. Patient is to follow up with surgery in two weeks for wound assessment. Patient verbalized understanding. Patient told if symtoms worsen to return to the emergency department. Patient verbalized understanding.        Goals of Care Treatment Preferences:  Code Status: Full Code      Consults:   Consults (From admission, onward)        Status Ordering Provider     Inpatient consult to Social Work  Once        Provider:  (Not yet assigned)    Completed LUCIEN CARVER     Inpatient consult to Social Work  Once        Provider:  (Not yet assigned)    Completed LINDA PHAM     Inpatient consult to Social Work  Once        Provider:  (Not yet assigned)    Completed LINDA PHAM     Inpatient consult to General Surgery  Once        Provider:  Marilia Myers MD    Completed SELENE PAIZ          Endocrine  * Diabetic foot ulcer associated with type 2 diabetes mellitus  Patient's FSGs are uncontrolled due to hyperglycemia on current medication regimen.  Last A1c reviewed-   Lab Results   Component Value Date    HGBA1C 11.7 (H) 04/26/2023     Most recent fingerstick glucose reviewed- No results for input(s): POCTGLUCOSE in the last 24 hours.  Current correctional scale  Medium  Maintain anti-hyperglycemic dose as follows-   Antihyperglycemics (From admission, onward)    Start     Stop Route Frequency Ordered    05/01/23 2100  insulin detemir U-100 injection 30 Units         -- SubQ Nightly 05/01/23 1115    04/30/23 1130  insulin aspart U-100 injection 7 Units         -- SubQ 3 times daily with meals 04/30/23 0709    04/26/23 1455  insulin aspart U-100 injection 1-10 Units         -- SubQ Every 6 hours PRN 04/26/23 1355        Hold Oral hypoglycemics while patient is in the hospital.    Patient has not been complaint with medication and  diet  Surgery Consulted thank them for their assistance  Wound Care consulted thank them for their assistance  Wound Culture and Blood cultures pending  ESR, CRP pending  Xray of Right foot pending  Vancomycin pharmacy to Dose  Zosyn 4.45 Q8hr will monitor cultures    4/27/23: surgical debridement of ulcer done today    4/28/23: bone culture/ wound culture GBS: awaiting sensitivity will consider discharge soon    4/28/23: changed vancomycin to Rocephin. Will consider outpatient IV antibiotic on discharge.     5/1/2023: Planning outpatient LTAC vs swingbed to continue antibiotic. Or home health Antibiotic with PICC line    5/2/23: Discharge home today.         Final Active Diagnoses:    Diagnosis Date Noted POA    PRINCIPAL PROBLEM:  Diabetic foot ulcer associated with type 2 diabetes mellitus [E11.621, L97.509] 04/26/2023 Yes    Hypertension [I10] 04/26/2023 Yes    Hyperlipidemia [E78.5] 04/26/2023 Yes    Gangrene of toe [I96] 04/26/2023 Yes    Diabetes mellitus [E11.9]  Yes      Problems Resolved During this Admission:       Discharged Condition: stable    Disposition: Home or Self Care    Follow Up:   Contact information for follow-up providers     [facility] Follow up in 1 week(s).    Why: hospitial follow up on May 12 at 1:00  Contact information:  [address] [place] [id number]  [phone]             Fred Tan MD. Schedule an appointment as soon as possible for a visit in 2 week(s).    Specialties: General Surgery, Surgery  Why: Surgery follow up and wound assessment.  Contact information:  [address] [place] [address]  [phone]                   Contact information for after-discharge care     Dialysis/Infusion     [facility] .    Service: Home Infusion and Injection  Contact information:  [address] [place] [address]  [phone]                 Home Medical Care     [facility] .    Service: Home Health  Services  Contact information:  [facility] [address] [place] [address]  [phone]                           Patient Instructions:      Basic metabolic panel   Standing Status: Future Standing Exp. Date: 06/30/24   Order Comments: Weekly     CBC Auto Differential   Standing Status: Future Standing Exp. Date: 06/30/24   Order Comments: Weekly one week after antibiotic complete     CK   Standing Status: Future Standing Exp. Date: 06/30/24   Order Comments: Weekly     Activity as tolerated       Significant Diagnostic Studies: Labs:   BMP:   Recent Labs   Lab 05/01/23 0437 05/02/23 0821   * 146*    139   K 4.3 3.8    102   CO2 28 30   BUN 17 16   CREATININE 0.83 0.89   CALCIUM 9.4 9.2    and CBC   Recent Labs   Lab 05/01/23 0437   WBC 7.28   HGB 13.8   HCT 41.4   *       Pending Diagnostic Studies:     Procedure Component Value Units Date/Time    EXTRA TUBES [[id number]] Collected: 04/26/23 1429    Order Status: Sent Lab Status: In process Updated: 04/26/23 1429    Specimen: Blood, Venous     Narrative:      The following orders were created for panel order EXTRA TUBES.  Procedure                               Abnormality         Status                     ---------                               -----------         ------                     Gold Top Hold[[id number]]                                    In process                   Please view results for these tests on the individual orders.         Medications:  Reconciled Home Medications:      Medication List      START taking these medications    hydroCHLOROthiazide 25 MG tablet  Commonly known as: HYDRODIURIL  Take 1 tablet (25 mg total) by mouth once daily.  Start taking on: May 3, 2023     insulin detemir U-100 100 unit/mL injection  Commonly known as: Levemir  Inject 30 Units into the skin 2 (two) times a day.     sodium chloride 0.9% SolP 50 mL with DAPTOmycin 500 mg SolR 495.5 mg  Inject 495.5 mg into the vein once  daily.        CONTINUE taking these medications    aspirin 81 MG EC tablet  Commonly known as: ECOTRIN  Take 81 mg by mouth once daily.     glimepiride 4 MG tablet  Commonly known as: AMARYL  Take 4 mg by mouth before breakfast.     metFORMIN 500 MG tablet  Commonly known as: GLUCOPHAGE  Take 500 mg by mouth 2 (two) times daily with meals.        STOP taking these medications    pravastatin 20 MG tablet  Commonly known as: PRAVACHOL            Indwelling Lines/Drains at time of discharge:   Lines/Drains/Airways     Peripherally Inserted Central Catheter Line  Duration           PICC Double Lumen 05/01/23 0921 left basilic 1 day                Time spent on the discharge of patient: 45   minutes         Aaron Magaña MD  Department of Hospital Medicine  [facility]

## 2023-05-02 NOTE — PLAN OF CARE
[facility]  Discharge Final Note    Primary Care Provider: [facility]    Expected Discharge Date: 5/2/2023    Final Discharge Note (most recent)       Final Note - 05/02/23 1322          Final Note    Assessment Type Final Discharge Note     Anticipated Discharge Disposition Home-Health Care Svc   [facility] [place]    What phone number can be called within the next 1-3 days to see how you are doing after discharge? [phone]        Post-Acute Status    Post-Acute Authorization Home Health;IV Infusion     Home Health Status Set-up Complete/Auth obtained     IV Infusion Status Set-up Complete/Auth obtained     Patient choice form signed by patient/caregiver List from CMS Compare;List with quality metrics by geographic area provided     Discharge Delays None known at this time                          Follow-up providers       [facility]   Relationship: PCP - [address] [address]   Phone: [phone]       Next Steps: Follow up in 1 week(s)    Instructions: hospitial follow up on May 12 at 1:00    Fred Tan MD   Specialty: General Surgery, Surgery    [address] [facility] [address]   Phone: [phone]       Next Steps: Schedule an appointment as soon as possible for a visit in 2 week(s)    Instructions: Surgery follow up and wound assessment.              After-discharge care                Durable Medical Equipment       [facility]   Service: Durable Medical Equipment    [address] [facility] [address]   Phone: [phone]                 Dialysis/Infusion       [facility]   Service: Home Infusion and Injection    [address] [facility] [address]   Phone: [phone]                 Home Medical Care       [facility]   Service: Home Health Services    [address]  [facility] [address]   Phone: [phone]                              faxed dc orders to [facility] and faxed dc  summary, orders, avs and risk assessment to [facility]. Pt to dc today, no other needs.

## 2023-05-02 NOTE — NURSING
Patient taken to vehicle in wheelchair. Prescription for norco sent with patient. Patient to  other prescriptions at pharmacy.

## 2023-05-02 NOTE — PLAN OF CARE
MAURICIO received call from Marito at [facility] [place] stating that pt's insurance has approved IV ABX and will pay 100%. Sw informed physician. MAURICIO f/u with [facility], who stated that insurance has cleared patient and [facility] will be speaking with pt shortly to arrange services. Ss following.

## 2023-05-02 NOTE — PROGRESS NOTES
Dressing changed.  Unpacked and repacked.  Wound is healing well without bleeding or drainage.  Patient ready for discharge.  Will continue Rocephin 1 g IV daily per [facility].  Follow-up in clinic.

## 2023-05-02 NOTE — PLAN OF CARE
MAURICIO informed, physician that pt would need a dose of iv abx before dc. SW will fax dc orders and summary to [facility] and [employer] when available. SW following.

## 2023-05-02 NOTE — NURSING
Discharge instructions reviewed with patient and spouse; and copy given to patient. Patient and spouse voiced understanding regarding:meds, appt., signs and symptoms to report to physician.         1) No driving until follow up appointment.  2) May shower on day that dressing removed. (No tub [place]).  3) Notify your healthcare provider if you experience any of the following: temperature >100.4  4) Notify your healthcare provider if you experience any of the following: redness, tenderness, or      Or signs of infection (pain, swelling, redness, odor or green/ yellow discharge around incision site.)  5) Notify your healthcare provider if you experience any of the following: difficulty breathing or     increased cough.  6)Clean incision daily with vasche solution then pat dry.   7)Notify your physician if you experience any persistent nausea, vomiting, or diarrhea or headache  8)Notify your physician if you experience any of the following:severe uncontrolled pain;worsening rash, increased confusion or weakness; dizziness, lightheadedness or visual disturbances

## 2023-05-02 NOTE — ASSESSMENT & PLAN NOTE
Patient's FSGs are uncontrolled due to hyperglycemia on current medication regimen.  Last A1c reviewed-   Lab Results   Component Value Date    HGBA1C 11.7 (H) 04/26/2023     Most recent fingerstick glucose reviewed- No results for input(s): POCTGLUCOSE in the last 24 hours.  Current correctional scale  Medium  Maintain anti-hyperglycemic dose as follows-   Antihyperglycemics (From admission, onward)    Start     Stop Route Frequency Ordered    05/01/23 2100  insulin detemir U-100 injection 30 Units         -- SubQ Nightly 05/01/23 1115    04/30/23 1130  insulin aspart U-100 injection 7 Units         -- SubQ 3 times daily with meals 04/30/23 0709    04/26/23 1455  insulin aspart U-100 injection 1-10 Units         -- SubQ Every 6 hours PRN 04/26/23 1355        Hold Oral hypoglycemics while patient is in the hospital.    Patient has not been complaint with medication and diet  Surgery Consulted thank them for their assistance  Wound Care consulted thank them for their assistance  Wound Culture and Blood cultures pending  ESR, CRP pending  Xray of Right foot pending  Vancomycin pharmacy to Dose  Zosyn 4.45 Q8hr will monitor cultures    4/27/23: surgical debridement of ulcer done today    4/28/23: bone culture/ wound culture GBS: awaiting sensitivity will consider discharge soon    4/28/23: changed vancomycin to Rocephin. Will consider outpatient IV antibiotic on discharge.     5/1/2023: Planning outpatient LTAC vs swingbed to continue antibiotic. Or home health Antibiotic with PICC line    5/2/23: Discharge home today.

## 2023-05-03 ENCOUNTER — TELEPHONE (OUTPATIENT)
Dept: ORTHOPEDICS | Facility: HOSPITAL | Age: 53
End: 2023-05-03
Payer: COMMERCIAL

## 2023-05-03 LAB — BACTERIA SPEC ANAEROBE CULT: ABNORMAL

## 2023-05-15 ENCOUNTER — OFFICE VISIT (OUTPATIENT)
Dept: SURGERY | Facility: CLINIC | Age: 53
End: 2023-05-15
Attending: SURGERY
Payer: COMMERCIAL

## 2023-05-15 VITALS — HEART RATE: 70 BPM | OXYGEN SATURATION: 98 % | TEMPERATURE: 97 F

## 2023-05-15 DIAGNOSIS — E11.621 DIABETIC ULCER OF TOE OF RIGHT FOOT ASSOCIATED WITH TYPE 2 DIABETES MELLITUS, WITH BONE INVOLVEMENT WITHOUT EVIDENCE OF NECROSIS: ICD-10-CM

## 2023-05-15 DIAGNOSIS — L97.516 DIABETIC ULCER OF TOE OF RIGHT FOOT ASSOCIATED WITH TYPE 2 DIABETES MELLITUS, WITH BONE INVOLVEMENT WITHOUT EVIDENCE OF NECROSIS: ICD-10-CM

## 2023-05-15 DIAGNOSIS — Z09 POSTOP CHECK: Primary | ICD-10-CM

## 2023-05-15 PROCEDURE — 1159F PR MEDICATION LIST DOCUMENTED IN MEDICAL RECORD: ICD-10-PCS | Mod: ,,, | Performed by: SURGERY

## 2023-05-15 PROCEDURE — 1159F MED LIST DOCD IN RCRD: CPT | Mod: ,,, | Performed by: SURGERY

## 2023-05-15 PROCEDURE — 3046F HEMOGLOBIN A1C LEVEL >9.0%: CPT | Mod: ,,, | Performed by: SURGERY

## 2023-05-15 PROCEDURE — 99213 OFFICE O/P EST LOW 20 MIN: CPT | Mod: PBBFAC | Performed by: SURGERY

## 2023-05-15 PROCEDURE — 3046F PR MOST RECENT HEMOGLOBIN A1C LEVEL > 9.0%: ICD-10-PCS | Mod: ,,, | Performed by: SURGERY

## 2023-05-15 PROCEDURE — 99024 POSTOP FOLLOW-UP VISIT: CPT | Mod: ,,, | Performed by: SURGERY

## 2023-05-15 PROCEDURE — 99024 PR POST-OP FOLLOW-UP VISIT: ICD-10-PCS | Mod: ,,, | Performed by: SURGERY

## 2023-05-16 NOTE — ASSESSMENT & PLAN NOTE
Status post debridement of bone.    Patient is receiving a 6 week course of IV antibiotics.    Follow-up in 3 weeks.

## 2023-05-16 NOTE — PROGRESS NOTES
Patient here for postop visit after debridement of right great toe to include the IP joint.  He reports no problems.  Patient receiving long-term antibiotics at home.    Wound looks good and measures 0.5 cm x 1 cm 0.8 cm  Fibrin and slough debrided from the wound.  There was no exposed bone.    Continue IV antibiotics  Continue wound care regimen  Follow-up in 2-3 weeks.    
19-Nov-2021 16:15

## 2023-06-02 ENCOUNTER — OFFICE VISIT (OUTPATIENT)
Dept: SURGERY | Facility: CLINIC | Age: 53
End: 2023-06-02
Attending: SURGERY
Payer: COMMERCIAL

## 2023-06-02 VITALS — RESPIRATION RATE: 99 BRPM | TEMPERATURE: 98 F | HEART RATE: 88 BPM

## 2023-06-02 DIAGNOSIS — Z09 POSTOP CHECK: Primary | ICD-10-CM

## 2023-06-02 PROCEDURE — 1159F PR MEDICATION LIST DOCUMENTED IN MEDICAL RECORD: ICD-10-PCS | Mod: ,,, | Performed by: SURGERY

## 2023-06-02 PROCEDURE — 99213 OFFICE O/P EST LOW 20 MIN: CPT | Mod: PBBFAC | Performed by: SURGERY

## 2023-06-02 PROCEDURE — 99024 POSTOP FOLLOW-UP VISIT: CPT | Mod: ,,, | Performed by: SURGERY

## 2023-06-02 PROCEDURE — 3046F HEMOGLOBIN A1C LEVEL >9.0%: CPT | Mod: ,,, | Performed by: SURGERY

## 2023-06-02 PROCEDURE — 1159F MED LIST DOCD IN RCRD: CPT | Mod: ,,, | Performed by: SURGERY

## 2023-06-02 PROCEDURE — 99024 PR POST-OP FOLLOW-UP VISIT: ICD-10-PCS | Mod: ,,, | Performed by: SURGERY

## 2023-06-02 PROCEDURE — 3046F PR MOST RECENT HEMOGLOBIN A1C LEVEL > 9.0%: ICD-10-PCS | Mod: ,,, | Performed by: SURGERY

## 2023-06-02 NOTE — LETTER
June 2, 2023      [facility]  [address]  [place] [address]  Phone: [phone]  Fax: [phone]       Patient: James Bolaños   YOB: 1970  Date of Visit: 06/02/2023    To Whom It May Concern:    Sally Bolaños  was at [facility] on 06/02/2023. The patient may return to work on 6/5/23 with no restrictions. Mr Bolaños has been under Dr Kent care from 4/26/23 until return to work date 6/5/23 If you have any questions or concerns, or if I can be of further assistance, please do not hesitate to contact me.    Sincerely,    Dr Fred Tan M.D.

## 2023-06-02 NOTE — PROGRESS NOTES
Patient returns for follow-up after debridement of toe and treatment for osteomyelitis.  He reports that the wound is healed.  The patient has 5 more days of antibiotics through a PICC line.  On exam, then wound has healed except for a small scab.  There was no drainage or erythema.    I will allow the patient to go back to work.  I would recommend completing the antibiotics and then having the PICC removed.  Work excuse was provided, and forearms for short-term disability have been filled out and signed.     Follow-up with kaitlyn summers.

## 2023-06-12 ENCOUNTER — DOCUMENT SCAN (OUTPATIENT)
Dept: HOME HEALTH SERVICES | Facility: HOSPITAL | Age: 53
End: 2023-06-12
Payer: COMMERCIAL

## 2023-09-04 PROBLEM — Z09 POSTOP CHECK: Status: RESOLVED | Noted: 2021-11-16 | Resolved: 2023-09-04

## 2025-01-06 NOTE — SUBJECTIVE & OBJECTIVE
----- Message from Trish Moyer DO sent at 1/6/2025  3:24 PM CST -----  Please inform parent that Josselyn's lab test has come back reassuring and she does not have anemia.      Past Medical History:   Diagnosis Date    Diabetes mellitus     Hypertension        Past Surgical History:   Procedure Laterality Date    CHOLECYSTECTOMY      DEBRIDEMENT OF FOOT      DEBRIDEMENT OF LOWER EXTREMITY Right 10/23/2021    Procedure: DEBRIDEMENT, LOWER EXTREMITY;  Surgeon: Fred Tan MD;  Location: [facility] OR;  Service: General;  Laterality: Right;  right foot    JOINT REPLACEMENT      TOE AMPUTATION Right 10/23/2021    Procedure: AMPUTATION, TOE;  Surgeon: Fred Tan MD;  Location: [facility] OR;  Service: General;  Laterality: Right;  second toe       Review of patient's allergies indicates:  No Known Allergies    No current facility-administered medications on file prior to encounter.     Current Outpatient Medications on File Prior to Encounter   Medication Sig    aspirin (ECOTRIN) 81 MG EC tablet Take 81 mg by mouth once daily.    glimepiride (AMARYL) 4 MG tablet Take 4 mg by mouth before breakfast.    metFORMIN (GLUCOPHAGE) 500 MG tablet Take 500 mg by mouth 2 (two) times daily with meals.    pravastatin (PRAVACHOL) 20 MG tablet Take 20 mg by mouth every evening.    [DISCONTINUED] Lactobacillus acidophilus 500 million cell Cap Take 1 capsule by mouth 3 (three) times daily with meals.    [DISCONTINUED] traMADoL 100 mg Tab Take 50 mg by mouth every 6 (six) hours as needed for Pain.     Family History       Problem Relation (Age of Onset)    Diabetes Mother, Father    Heart disease Father          Tobacco Use    Smoking status: Former    Smokeless tobacco: Current     Types: Chew    Tobacco comments:     hasnt smoked since [employer]   Substance and Sexual Activity    Alcohol use: Yes     Alcohol/week: 24.0 standard drinks     Types: 24 Cans of beer per week    Drug use: Never    Sexual activity: Yes     Partners: Female     Review of Systems   Constitutional:  Negative for fatigue and fever.   HENT:  Negative for ear discharge, ear pain, facial swelling, sinus pressure, sinus pain and  sneezing.    Eyes:  Negative for pain, discharge and itching.   Respiratory:  Negative for cough and shortness of breath.    Cardiovascular: Negative.    Gastrointestinal:  Negative for abdominal distention, abdominal pain, anal bleeding, constipation, diarrhea and nausea.   Endocrine: Negative.    Genitourinary:  Negative for difficulty urinating, flank pain and frequency.   Musculoskeletal:  Positive for arthralgias.   Skin:  Positive for wound.   Allergic/Immunologic: Negative.    Neurological:  Negative for light-headedness and headaches.   Psychiatric/Behavioral: Negative.     Objective:     Vital Signs (Most Recent):  Temp: 98 °F (36.7 °C) (04/26/23 1206)  Pulse: 86 (04/26/23 1206)  Resp: 16 (04/26/23 1206)  BP: (!) 143/88 (04/26/23 1206)  SpO2: 98 % (04/26/23 1206)   Vital Signs (24h Range):  Temp:  [98 °F (36.7 °C)] 98 °F (36.7 °C)  Pulse:  [86] 86  Resp:  [16] 16  SpO2:  [98 %] 98 %  BP: (143)/(88) 143/88     Weight: 82.6 kg (182 lb)  Body mass index is 28.51 kg/m².    Physical Exam  Constitutional:       Appearance: Normal appearance. He is normal weight.   HENT:      Head: Normocephalic and atraumatic.      Right Ear: Tympanic membrane normal.      Left Ear: Tympanic membrane normal.      Nose: Nose normal.      Mouth/Throat:      Mouth: Mucous membranes are moist.      Pharynx: Oropharynx is clear.   Eyes:      Conjunctiva/sclera: Conjunctivae normal.      Pupils: Pupils are equal, round, and reactive to light.   Cardiovascular:      Rate and Rhythm: Normal rate and regular rhythm.      Pulses: Normal pulses.      Heart sounds: Normal heart sounds.   Pulmonary:      Effort: Pulmonary effort is normal.      Breath sounds: Normal breath sounds.   Abdominal:      General: Abdomen is flat. Bowel sounds are normal.   Musculoskeletal:         General: Deformity present. Normal range of motion.      Cervical back: Normal range of motion.      Comments: Right great toe ulcer with erythema and swelling  Second  toe on R amputated   Skin:     General: Skin is warm and dry.      Capillary Refill: Capillary refill takes less than 2 seconds.   Neurological:      Mental Status: He is alert and oriented to person, place, and time.   Psychiatric:         Mood and Affect: Mood normal.         CRANIAL NERVES     CN III, IV, VI   Pupils are equal, round, and reactive to light.     Significant Labs: All pertinent labs within the past 24 hours have been reviewed.  CBC:   Recent Labs   Lab 04/26/23  1232   WBC 7.90   HGB 12.7*   HCT 37.7*        CMP:   Recent Labs   Lab 04/26/23  1232   *   K 3.6      CO2 28   *   BUN 10   CREATININE 0.81   CALCIUM 9.0   PROT 8.5*   ALBUMIN 2.9*   BILITOT 0.6   ALKPHOS 104   AST 8*   ALT 15*   ANIONGAP 11       Significant Imaging: I have reviewed all pertinent imaging results/findings within the past 24 hours.

## (undated) DEVICE — GLOVE SURGICAL PROTEXIS PI SIZE 6.5

## (undated) DEVICE — CDS EXTREMITY

## (undated) DEVICE — GOWN SURGICAL STERILE LEVEL 3 / XX-LARGE

## (undated) DEVICE — CULTURETTE AEROBIC SWAB

## (undated) DEVICE — GLOVE 6.0 PROTEXIS PI BLUE

## (undated) DEVICE — GLOVE SURGICAL PROTEXIS PI BLUE SIZE 6.5

## (undated) DEVICE — GLOVE SURGICAL PROTEXIS PI SIZE 7

## (undated) DEVICE — GLOVE SURGICAL PROTEXIS PI BLUE SIZE 7.0

## (undated) DEVICE — SWAB AEROBIC CULTURETTE

## (undated) DEVICE — GLOVE PROTEXIS PI SYN SURG 7

## (undated) DEVICE — SWAB BBL CULTURETTE

## (undated) DEVICE — GLOVE SURGICAL PROTEXIS PI SIZE 8.0

## (undated) DEVICE — TRAY SKIN SCRUB WET PREMIUM

## (undated) DEVICE — SPONGE GAUZE 4X4 12 PLY STL AMD 10/TRAY

## (undated) DEVICE — BANDAGE KERLIX AMD  4.5IN  SPONGE ROLL

## (undated) DEVICE — SYRINGE 10-12CC LURE -LOK TIP

## (undated) DEVICE — DERM CURETTE 5MM DISP

## (undated) DEVICE — DISH PETRI DISPOSABLE

## (undated) DEVICE — GLOVE PROTEXIS PI SYN SURG 6.0

## (undated) DEVICE — TUBING SUCTION STERILE

## (undated) DEVICE — TRAY SKIN PREP PROVIDONE IODINE STERILE LATEX FREE

## (undated) DEVICE — SOL NACL IRR 1000ML BTL

## (undated) DEVICE — GLOVE 7.0 PROTEXIS PI BLUE

## (undated) DEVICE — CURETTE DERMAL DISP 5MM

## (undated) DEVICE — SOL IRRIGATION SALINE 0.9% 1000ML BOTTLE

## (undated) DEVICE — ELECTRODE BLADE INSULATED 1 IN

## (undated) DEVICE — NEEDLE ECLIPSE 22GX1 1/2 IN SAFETY

## (undated) DEVICE — Device

## (undated) DEVICE — HANDLE MEDIVAC SUC YANK BLBOUS

## (undated) DEVICE — BANDAGE GAUZE COT STRL 4.5X4.1

## (undated) DEVICE — GOWN NONREINF SET-IN SLV 2XL

## (undated) DEVICE — SPONGE COTTON TRAY 4X4IN

## (undated) DEVICE — CULTURETTE ANAEROBIC COLLECTOR

## (undated) DEVICE — GLOVE PROTEXIS PI SYN SURG 8.0

## (undated) DEVICE — STRIP PACKING IODOFORM 1/2X5YD